# Patient Record
Sex: FEMALE | Race: ASIAN | NOT HISPANIC OR LATINO | URBAN - METROPOLITAN AREA
[De-identification: names, ages, dates, MRNs, and addresses within clinical notes are randomized per-mention and may not be internally consistent; named-entity substitution may affect disease eponyms.]

---

## 2020-06-30 ENCOUNTER — CLAIMS CREATED FROM THE CLAIM WINDOW (OUTPATIENT)
Dept: URBAN - METROPOLITAN AREA TELEHEALTH 2 | Facility: TELEHEALTH | Age: 55
End: 2020-06-30
Payer: COMMERCIAL

## 2020-06-30 DIAGNOSIS — F41.9 ANXIETY: ICD-10-CM

## 2020-06-30 DIAGNOSIS — K31.84 GASTROPARESIS: ICD-10-CM

## 2020-06-30 DIAGNOSIS — R07.89 ATYPICAL CHEST PAIN: ICD-10-CM

## 2020-06-30 DIAGNOSIS — R49.0 HOARSENESS: ICD-10-CM

## 2020-06-30 DIAGNOSIS — R09.89 GLOBUS PHARYNGEUS: ICD-10-CM

## 2020-06-30 DIAGNOSIS — K58.9 IBS (IRRITABLE BOWEL SYNDROME): ICD-10-CM

## 2020-06-30 DIAGNOSIS — K21.9 GERD: ICD-10-CM

## 2020-06-30 DIAGNOSIS — K59.01 CONSTIPATION: ICD-10-CM

## 2020-06-30 PROCEDURE — 99214 OFFICE O/P EST MOD 30 MIN: CPT | Performed by: INTERNAL MEDICINE

## 2020-06-30 RX ORDER — ESOMEPRAZOLE MAGNESIUM 40 MG/1
1 CAPSULE CAPSULE, DELAYED RELEASE ORAL ONCE A DAY
Status: ACTIVE | COMMUNITY

## 2020-06-30 RX ORDER — SUCRALFATE 1 G/1
TABLET ORAL
Qty: 0 | Refills: 0 | Status: DISCONTINUED | COMMUNITY
Start: 1900-01-01

## 2020-06-30 RX ORDER — OLOPATADINE HYDROCHLORIDE 665 UG/1
2 SPRAYS IN EACH NOSTRIL SPRAY NASAL TWICE A DAY
Status: ACTIVE | COMMUNITY

## 2020-06-30 RX ORDER — ESTRADIOL 0.03 MG/D
1 PATCH TO SKIN PATCH TRANSDERMAL
Status: DISCONTINUED | COMMUNITY

## 2020-06-30 RX ORDER — FAMOTIDINE 40 MG/1
TAKE 1 TABLET (40 MG) BY ORAL ROUTE ONCE DAILY AT BEDTIME FOR 30 DAYS TABLET ORAL 1
Qty: 30 | Refills: 11 | Status: DISCONTINUED | COMMUNITY
Start: 2019-10-17 | End: 2020-10-11

## 2020-06-30 RX ORDER — ONDANSETRON 4 MG/1
1 DOSE PO/SL Q4HR PRN NAUSEA TABLET, ORALLY DISINTEGRATING ORAL
Qty: 60 | Refills: 3 | Status: ACTIVE | COMMUNITY
Start: 2019-07-12

## 2020-06-30 RX ORDER — ESTRADIOL 14 UG/D
APPLY 1 PATCH TO THE LOWER ABDOMEN AREA BY TRANSDERMAL ROUTE EVERY 7 DAYS PATCH TRANSDERMAL
Qty: 1 | Refills: 0 | Status: DISCONTINUED | COMMUNITY
Start: 1900-01-01

## 2020-06-30 RX ORDER — DOCUSATE SODIUM 100 MG/1
1 CAPSULE AS NEEDED CAPSULE, LIQUID FILLED ORAL ONCE A DAY
Status: ACTIVE | COMMUNITY

## 2020-06-30 RX ORDER — ESTRADIOL 0.03 MG/D
1 PATCH TO SKIN PATCH TRANSDERMAL
Status: ACTIVE | COMMUNITY

## 2020-06-30 RX ORDER — ESTRADIOL 10 UG/1
INSERT VAGINAL
Qty: 0 | Refills: 0 | Status: ACTIVE | COMMUNITY
Start: 1900-01-01

## 2020-06-30 NOTE — HPI-OTHER HISTORIES
wt stable over 8mo = 111#  On PPI BID, H2B, and GI cocktail prn  CP/globus/hoarseness persistent  tried Reglan - helped somewhat - got lightheaded  tried Lexapro/Zoloft - caused fatigue  Diarrhea/Constip under good control uses stool soft and prunes to stay regular

## 2020-07-15 ENCOUNTER — OFFICE VISIT (OUTPATIENT)
Dept: URBAN - METROPOLITAN AREA CLINIC 27 | Facility: CLINIC | Age: 55
End: 2020-07-15

## 2021-01-15 ENCOUNTER — OFFICE VISIT (OUTPATIENT)
Dept: URBAN - METROPOLITAN AREA CLINIC 27 | Facility: CLINIC | Age: 56
End: 2021-01-15

## 2021-05-07 ENCOUNTER — OFFICE VISIT (OUTPATIENT)
Dept: URBAN - METROPOLITAN AREA MEDICAL CENTER 8 | Facility: MEDICAL CENTER | Age: 56
End: 2021-05-07

## 2021-06-18 ENCOUNTER — OFFICE VISIT (OUTPATIENT)
Dept: URBAN - METROPOLITAN AREA MEDICAL CENTER 8 | Facility: MEDICAL CENTER | Age: 56
End: 2021-06-18

## 2021-07-12 ENCOUNTER — OFFICE VISIT (OUTPATIENT)
Dept: URBAN - METROPOLITAN AREA CLINIC 27 | Facility: CLINIC | Age: 56
End: 2021-07-12

## 2021-09-10 ENCOUNTER — OFFICE VISIT (OUTPATIENT)
Dept: URBAN - METROPOLITAN AREA CLINIC 27 | Facility: CLINIC | Age: 56
End: 2021-09-10

## 2021-12-10 ENCOUNTER — OFFICE VISIT (OUTPATIENT)
Dept: URBAN - METROPOLITAN AREA CLINIC 27 | Facility: CLINIC | Age: 56
End: 2021-12-10

## 2022-04-30 ENCOUNTER — TELEPHONE ENCOUNTER (OUTPATIENT)
Dept: URBAN - METROPOLITAN AREA CLINIC 121 | Facility: CLINIC | Age: 57
End: 2022-04-30

## 2022-04-30 RX ORDER — METOCLOPRAMIDE HYDROCHLORIDE 5 MG/1
TABLET ORAL
OUTPATIENT
Start: 2020-03-16

## 2022-04-30 RX ORDER — BUPROPION HYDROCHLORIDE 300 MG/1
TABLET, EXTENDED RELEASE ORAL
OUTPATIENT
Start: 2020-03-16

## 2022-04-30 RX ORDER — FLUTICASONE PROPIONATE 50 UG/1
SPRAY, METERED NASAL
OUTPATIENT
Start: 2020-03-16

## 2022-04-30 RX ORDER — ESTRADIOL 10 UG/1
INSERT VAGINAL
OUTPATIENT
Start: 2020-03-16

## 2022-04-30 RX ORDER — ESOMEPRAZOLE MAGNESIUM 20 MG
TAKE 1 CAPSULE PO QD CAPSULE,DELAYED RELEASE (ENTERIC COATED) ORAL
OUTPATIENT
Start: 2021-08-09

## 2022-04-30 RX ORDER — LACTASE 9000 UNIT
TABLET ORAL
OUTPATIENT
Start: 2021-07-12

## 2022-04-30 RX ORDER — BACLOFEN 10 MG/1
TAKE 1 TABLET PO BID TABLET ORAL
OUTPATIENT
Start: 2020-03-16

## 2022-04-30 RX ORDER — ESOMEPRAZOLE MAGNESIUM 20 MG
CAPSULE,DELAYED RELEASE (ENTERIC COATED) ORAL
OUTPATIENT
Start: 2020-03-16

## 2022-04-30 RX ORDER — OLOPATADINE HYDROCHLORIDE 600 UG/1
SPRAY, METERED NASAL
OUTPATIENT
Start: 2020-03-16

## 2022-04-30 RX ORDER — CANDESARTAN CILEXETIL 16 MG/1
TABLET ORAL
OUTPATIENT
Start: 2020-03-16

## 2022-04-30 RX ORDER — CLINDAMYCIN PHOSPHATE 150 MG/ML
INJECTION, SOLUTION INTRAMUSCULAR; INTRAVENOUS
OUTPATIENT
Start: 2020-03-16

## 2022-04-30 RX ORDER — FAMOTIDINE 10 MG
TABLET ORAL
OUTPATIENT
Start: 2020-03-16

## 2022-04-30 RX ORDER — FAMOTIDINE 20 MG
TAKE 1 TAB PO QHS TABLET ORAL
OUTPATIENT
Start: 2021-07-12

## 2022-04-30 RX ORDER — OMEPRAZOLE 20 MG/1
1 CAPSULE PO QAM CAPSULE, DELAYED RELEASE ORAL
OUTPATIENT
Start: 2021-07-12 | End: 2021-08-11

## 2022-04-30 RX ORDER — PROGESTERONE 100 MG/1
CAPSULE ORAL
OUTPATIENT
Start: 2020-03-16

## 2022-04-30 RX ORDER — ELECTROLYTES/DEXTROSE
SOLUTION, ORAL ORAL
OUTPATIENT
Start: 2020-03-16

## 2022-04-30 RX ORDER — ESOMEPRAZOLE MAGNESIUM 40 MG/1
CAPSULE, DELAYED RELEASE ORAL
OUTPATIENT
Start: 2020-03-16

## 2022-04-30 RX ORDER — FAMOTIDINE 20 MG/1
TABLET ORAL
OUTPATIENT
Start: 2021-07-12

## 2022-04-30 RX ORDER — ESTRADIOL 0.03 MG/D
FILM, EXTENDED RELEASE TRANSDERMAL
OUTPATIENT
Start: 2020-03-16

## 2022-04-30 RX ORDER — ESOMEPRAZOLE MAGNESIUM 40 MG
1 CAPSULE PO QD CAPSULE,DELAYED RELEASE (ENTERIC COATED) ORAL
OUTPATIENT
Start: 2020-12-15

## 2022-05-01 ENCOUNTER — TELEPHONE ENCOUNTER (OUTPATIENT)
Dept: URBAN - METROPOLITAN AREA CLINIC 121 | Facility: CLINIC | Age: 57
End: 2022-05-01

## 2022-05-01 RX ORDER — ESOMEPRAZOLE MAGNESIUM 40 MG/1
CAPSULE, DELAYED RELEASE ORAL
Status: ACTIVE | COMMUNITY
Start: 2020-03-16

## 2022-05-01 RX ORDER — FAMOTIDINE 10 MG
TABLET ORAL
Status: ACTIVE | COMMUNITY
Start: 2020-03-16

## 2022-05-01 RX ORDER — CANDESARTAN CILEXETIL 16 MG/1
TABLET ORAL
Status: ACTIVE | COMMUNITY
Start: 2020-03-16

## 2022-05-01 RX ORDER — ESOMEPRAZOLE MAGNESIUM 20 MG
CAPSULE,DELAYED RELEASE (ENTERIC COATED) ORAL
Status: ACTIVE | COMMUNITY
Start: 2020-03-16

## 2022-05-01 RX ORDER — ESTRADIOL 0.03 MG/D
FILM, EXTENDED RELEASE TRANSDERMAL
Status: ACTIVE | COMMUNITY
Start: 2020-03-16

## 2022-05-01 RX ORDER — CHROMIUM 200 MCG
TABLET ORAL
Status: ACTIVE | COMMUNITY
Start: 2021-07-12

## 2022-05-01 RX ORDER — FAMOTIDINE 20 MG/1
TAKE 1 TABLET BY MOUTH EVERY NIGHT TABLET ORAL
Status: ACTIVE | COMMUNITY
Start: 2021-07-12 | End: 2023-04-03

## 2022-05-01 RX ORDER — CLINDAMYCIN PHOSPHATE 150 MG/ML
INJECTION, SOLUTION INTRAMUSCULAR; INTRAVENOUS
Status: ACTIVE | COMMUNITY
Start: 2020-03-16

## 2022-05-01 RX ORDER — ESOMEPRAZOLE MAGNESIUM 40 MG
1 CAPSULE BY MOUTH ONCE A DAY CAPSULE,DELAYED RELEASE (ENTERIC COATED) ORAL
Status: ACTIVE | COMMUNITY
Start: 2020-12-15

## 2022-05-01 RX ORDER — AMITRIPTYLINE HYDROCHLORIDE 10 MG/1
5MG PO QHS X1WEEK 10MG PO QHS X1WEEK 15MGPO QHS X1WEEK 20MGPO QHS X1WEEK TABLET, FILM COATED ORAL
Status: ACTIVE | COMMUNITY
Start: 2021-12-10

## 2022-05-01 RX ORDER — FAMOTIDINE 20 MG/1
TABLET ORAL
Status: ACTIVE | COMMUNITY
Start: 2021-07-12

## 2022-05-01 RX ORDER — ESTRADIOL 10 UG/1
INSERT VAGINAL
Status: ACTIVE | COMMUNITY
Start: 2020-03-16

## 2022-05-01 RX ORDER — ELECTROLYTES/DEXTROSE
SOLUTION, ORAL ORAL
Status: ACTIVE | COMMUNITY
Start: 2020-03-16

## 2022-05-01 RX ORDER — OLOPATADINE HYDROCHLORIDE 600 UG/1
SPRAY, METERED NASAL
Status: ACTIVE | COMMUNITY
Start: 2020-03-16

## 2022-05-01 RX ORDER — BACLOFEN 10 MG/1
TAKE 1 TABLET BY MOUTH TWICE A DAY TABLET ORAL
Status: ACTIVE | COMMUNITY
Start: 2020-03-16

## 2022-05-01 RX ORDER — BETAMETHASONE DIPROPIONATE 0.5 MG/G
CREAM TOPICAL
Status: ACTIVE | COMMUNITY
Start: 2020-03-16

## 2022-05-01 RX ORDER — METOCLOPRAMIDE HYDROCHLORIDE 5 MG/1
TABLET ORAL
Status: ACTIVE | COMMUNITY
Start: 2020-03-16

## 2022-05-01 RX ORDER — PROGESTERONE 100 MG/1
CAPSULE ORAL
Status: ACTIVE | COMMUNITY
Start: 2020-03-16

## 2022-05-01 RX ORDER — FLUTICASONE PROPIONATE 50 UG/1
SPRAY, METERED NASAL
Status: ACTIVE | COMMUNITY
Start: 2020-03-16

## 2022-05-01 RX ORDER — ESOMEPRAZOLE MAGNESIUM 20 MG
TAKE 1 CAPSULE BY MOUTH ONCE A DAY CAPSULE,DELAYED RELEASE (ENTERIC COATED) ORAL
Status: ACTIVE | COMMUNITY
Start: 2021-08-09

## 2022-05-01 RX ORDER — BUPROPION HYDROCHLORIDE 300 MG/1
TABLET, EXTENDED RELEASE ORAL
Status: ACTIVE | COMMUNITY
Start: 2020-03-16

## 2022-05-10 ENCOUNTER — OFFICE VISIT (OUTPATIENT)
Dept: URBAN - METROPOLITAN AREA CLINIC 27 | Facility: CLINIC | Age: 57
End: 2022-05-10

## 2022-05-16 ENCOUNTER — WEB ENCOUNTER (OUTPATIENT)
Dept: URBAN - METROPOLITAN AREA CLINIC 27 | Facility: CLINIC | Age: 57
End: 2022-05-16

## 2022-05-16 RX ORDER — ONDANSETRON 4 MG/1
1 DOSE PO/SL Q4HR PRN NAUSEA TABLET, ORALLY DISINTEGRATING ORAL
Qty: 60 | Refills: 3 | Status: ACTIVE | COMMUNITY
Start: 2019-07-12

## 2022-05-16 RX ORDER — OLOPATADINE HYDROCHLORIDE 600 UG/1
SPRAY, METERED NASAL
Status: ACTIVE | COMMUNITY
Start: 2020-03-16

## 2022-05-16 RX ORDER — BACLOFEN 10 MG/1
TAKE 1 TABLET BY MOUTH TWICE A DAY TABLET ORAL
Status: ACTIVE | COMMUNITY
Start: 2020-03-16

## 2022-05-16 RX ORDER — ESOMEPRAZOLE MAGNESIUM 20 MG
TAKE 1 CAPSULE BY MOUTH ONCE A DAY CAPSULE,DELAYED RELEASE (ENTERIC COATED) ORAL
Status: ACTIVE | COMMUNITY
Start: 2021-08-09

## 2022-05-16 RX ORDER — CLINDAMYCIN PHOSPHATE 150 MG/ML
INJECTION, SOLUTION INTRAMUSCULAR; INTRAVENOUS
Status: ACTIVE | COMMUNITY
Start: 2020-03-16

## 2022-05-16 RX ORDER — BUPROPION HYDROCHLORIDE 300 MG/1
TABLET, EXTENDED RELEASE ORAL
Status: ACTIVE | COMMUNITY
Start: 2020-03-16

## 2022-05-16 RX ORDER — OLOPATADINE HYDROCHLORIDE 665 UG/1
2 SPRAYS IN EACH NOSTRIL SPRAY NASAL TWICE A DAY
Status: ACTIVE | COMMUNITY

## 2022-05-16 RX ORDER — FLUTICASONE PROPIONATE 50 UG/1
SPRAY, METERED NASAL
Status: ACTIVE | COMMUNITY
Start: 2020-03-16

## 2022-05-16 RX ORDER — FAMOTIDINE 10 MG
TABLET ORAL
Status: ACTIVE | COMMUNITY
Start: 2020-03-16

## 2022-05-16 RX ORDER — ELECTROLYTES/DEXTROSE
SOLUTION, ORAL ORAL
Status: ACTIVE | COMMUNITY
Start: 2020-03-16

## 2022-05-16 RX ORDER — ESTRADIOL 10 UG/1
INSERT VAGINAL
Status: ACTIVE | COMMUNITY
Start: 2020-03-16

## 2022-05-16 RX ORDER — ESOMEPRAZOLE MAGNESIUM 40 MG/1
1 CAPSULE CAPSULE, DELAYED RELEASE ORAL ONCE A DAY
Status: ACTIVE | COMMUNITY

## 2022-05-16 RX ORDER — AMITRIPTYLINE HYDROCHLORIDE 10 MG/1
5MG PO QHS X1WEEK 10MG PO QHS X1WEEK 15MGPO QHS X1WEEK 20MGPO QHS X1WEEK TABLET, FILM COATED ORAL
Status: ACTIVE | COMMUNITY
Start: 2021-12-10

## 2022-05-16 RX ORDER — ESTRADIOL 0.03 MG/D
FILM, EXTENDED RELEASE TRANSDERMAL
Status: ACTIVE | COMMUNITY
Start: 2020-03-16

## 2022-05-16 RX ORDER — BETAMETHASONE DIPROPIONATE 0.5 MG/G
CREAM TOPICAL
Status: ACTIVE | COMMUNITY
Start: 2020-03-16

## 2022-05-16 RX ORDER — CANDESARTAN CILEXETIL 16 MG/1
TABLET ORAL
Status: ACTIVE | COMMUNITY
Start: 2020-03-16

## 2022-05-16 RX ORDER — PROGESTERONE 100 MG/1
CAPSULE ORAL
Status: ACTIVE | COMMUNITY
Start: 2020-03-16

## 2022-05-16 RX ORDER — METOCLOPRAMIDE HYDROCHLORIDE 5 MG/1
TABLET ORAL
Status: ACTIVE | COMMUNITY
Start: 2020-03-16

## 2022-05-16 RX ORDER — ESOMEPRAZOLE MAGNESIUM 40 MG
1 CAPSULE BY MOUTH ONCE A DAY CAPSULE,DELAYED RELEASE (ENTERIC COATED) ORAL
Status: ACTIVE | COMMUNITY
Start: 2020-12-15

## 2022-05-16 RX ORDER — DOCUSATE SODIUM 100 MG/1
1 CAPSULE AS NEEDED CAPSULE, LIQUID FILLED ORAL ONCE A DAY
Status: ACTIVE | COMMUNITY

## 2022-05-16 RX ORDER — ESOMEPRAZOLE MAGNESIUM 40 MG/1
CAPSULE, DELAYED RELEASE ORAL
Status: ACTIVE | COMMUNITY
Start: 2020-03-16

## 2022-05-16 RX ORDER — ESTRADIOL 0.03 MG/D
1 PATCH TO SKIN PATCH TRANSDERMAL
Status: ACTIVE | COMMUNITY

## 2022-05-16 RX ORDER — FAMOTIDINE 20 MG/1
TAKE 1 TABLET BY MOUTH EVERY NIGHT TABLET ORAL
Status: ACTIVE | COMMUNITY
Start: 2021-07-12 | End: 2023-04-03

## 2022-05-16 RX ORDER — FAMOTIDINE 40 MG/1
1 TABLET TABLET, FILM COATED ORAL ONCE A DAY
Qty: 30 TABLET | Refills: 3 | OUTPATIENT
Start: 2022-05-16

## 2022-05-16 RX ORDER — CHROMIUM 200 MCG
TABLET ORAL
Status: ACTIVE | COMMUNITY
Start: 2021-07-12

## 2022-06-17 ENCOUNTER — WEB ENCOUNTER (OUTPATIENT)
Dept: URBAN - METROPOLITAN AREA CLINIC 27 | Facility: CLINIC | Age: 57
End: 2022-06-17

## 2022-06-17 ENCOUNTER — CLAIMS CREATED FROM THE CLAIM WINDOW (OUTPATIENT)
Dept: URBAN - METROPOLITAN AREA CLINIC 27 | Facility: CLINIC | Age: 57
End: 2022-06-17
Payer: COMMERCIAL

## 2022-06-17 VITALS
HEART RATE: 82 BPM | RESPIRATION RATE: 17 BRPM | HEIGHT: 63 IN | TEMPERATURE: 96.9 F | BODY MASS INDEX: 19.84 KG/M2 | WEIGHT: 112 LBS | DIASTOLIC BLOOD PRESSURE: 88 MMHG | SYSTOLIC BLOOD PRESSURE: 119 MMHG

## 2022-06-17 DIAGNOSIS — K58.9 IBS (IRRITABLE BOWEL SYNDROME): ICD-10-CM

## 2022-06-17 DIAGNOSIS — K31.84 GASTROPARESIS: ICD-10-CM

## 2022-06-17 DIAGNOSIS — K21.9 GERD: ICD-10-CM

## 2022-06-17 DIAGNOSIS — R49.8 OTHER VOICE AND RESONANCE DISORDERS: ICD-10-CM

## 2022-06-17 PROCEDURE — 99214 OFFICE O/P EST MOD 30 MIN: CPT | Performed by: INTERNAL MEDICINE

## 2022-06-17 RX ORDER — BETAMETHASONE DIPROPIONATE 0.5 MG/G
CREAM TOPICAL
Status: ACTIVE | COMMUNITY
Start: 2020-03-16

## 2022-06-17 RX ORDER — ESOMEPRAZOLE MAGNESIUM 20 MG
TAKE 1 CAPSULE BY MOUTH ONCE A DAY CAPSULE,DELAYED RELEASE (ENTERIC COATED) ORAL
Status: ACTIVE | COMMUNITY
Start: 2021-08-09

## 2022-06-17 RX ORDER — ONDANSETRON 4 MG/1
1 DOSE PO/SL Q4HR PRN NAUSEA TABLET, ORALLY DISINTEGRATING ORAL
Qty: 60 | Refills: 3 | Status: ACTIVE | COMMUNITY
Start: 2019-07-12

## 2022-06-17 RX ORDER — ESTRADIOL 0.03 MG/D
FILM, EXTENDED RELEASE TRANSDERMAL
Status: ACTIVE | COMMUNITY
Start: 2020-03-16

## 2022-06-17 RX ORDER — CANDESARTAN CILEXETIL 16 MG/1
TABLET ORAL
Status: ACTIVE | COMMUNITY
Start: 2020-03-16

## 2022-06-17 RX ORDER — DOCUSATE SODIUM 100 MG/1
1 CAPSULE AS NEEDED CAPSULE, LIQUID FILLED ORAL ONCE A DAY
Status: ACTIVE | COMMUNITY

## 2022-06-17 RX ORDER — CLINDAMYCIN PHOSPHATE 150 MG/ML
INJECTION, SOLUTION INTRAMUSCULAR; INTRAVENOUS
Status: ACTIVE | COMMUNITY
Start: 2020-03-16

## 2022-06-17 RX ORDER — AMITRIPTYLINE HYDROCHLORIDE 10 MG/1
5MG PO QHS X1WEEK 10MG PO QHS X1WEEK 15MGPO QHS X1WEEK 20MGPO QHS X1WEEK TABLET, FILM COATED ORAL
Status: ACTIVE | COMMUNITY
Start: 2021-12-10

## 2022-06-17 RX ORDER — ESOMEPRAZOLE MAGNESIUM 20 MG/1
1 CAPSULE CAPSULE, DELAYED RELEASE ORAL ONCE A DAY
Qty: 30 | OUTPATIENT
Start: 2022-06-17

## 2022-06-17 RX ORDER — BACLOFEN 10 MG/1
TAKE 1 TABLET BY MOUTH TWICE A DAY TABLET ORAL
Status: ACTIVE | COMMUNITY
Start: 2020-03-16

## 2022-06-17 RX ORDER — ESOMEPRAZOLE MAGNESIUM 40 MG
1 CAPSULE BY MOUTH ONCE A DAY CAPSULE,DELAYED RELEASE (ENTERIC COATED) ORAL
Status: ACTIVE | COMMUNITY
Start: 2020-12-15

## 2022-06-17 RX ORDER — FAMOTIDINE 20 MG/1
TAKE 1 TABLET BY MOUTH EVERY NIGHT TABLET ORAL
Status: ACTIVE | COMMUNITY
Start: 2021-07-12 | End: 2023-04-03

## 2022-06-17 RX ORDER — ESTRADIOL 0.03 MG/D
1 PATCH TO SKIN PATCH TRANSDERMAL
Status: ACTIVE | COMMUNITY

## 2022-06-17 RX ORDER — ESOMEPRAZOLE MAGNESIUM 40 MG/1
CAPSULE, DELAYED RELEASE ORAL
Status: ACTIVE | COMMUNITY
Start: 2020-03-16

## 2022-06-17 RX ORDER — ESOMEPRAZOLE MAGNESIUM 40 MG/1
1 CAPSULE CAPSULE, DELAYED RELEASE ORAL ONCE A DAY
Status: ACTIVE | COMMUNITY

## 2022-06-17 RX ORDER — CHROMIUM 200 MCG
TABLET ORAL
Status: ACTIVE | COMMUNITY
Start: 2021-07-12

## 2022-06-17 RX ORDER — PROGESTERONE 100 MG/1
CAPSULE ORAL
Status: ACTIVE | COMMUNITY
Start: 2020-03-16

## 2022-06-17 RX ORDER — ESTRADIOL 10 UG/1
INSERT VAGINAL
Status: ACTIVE | COMMUNITY
Start: 2020-03-16

## 2022-06-17 RX ORDER — FLUTICASONE PROPIONATE 50 UG/1
SPRAY, METERED NASAL
Status: ACTIVE | COMMUNITY
Start: 2020-03-16

## 2022-06-17 RX ORDER — OLOPATADINE HYDROCHLORIDE 600 UG/1
SPRAY, METERED NASAL
Status: ACTIVE | COMMUNITY
Start: 2020-03-16

## 2022-06-17 RX ORDER — METOCLOPRAMIDE HYDROCHLORIDE 5 MG/1
TABLET ORAL
Status: ACTIVE | COMMUNITY
Start: 2020-03-16

## 2022-06-17 RX ORDER — FAMOTIDINE 40 MG/1
1 TABLET TABLET, FILM COATED ORAL ONCE A DAY
Qty: 30 TABLET | Refills: 3 | Status: ACTIVE | COMMUNITY
Start: 2022-05-16

## 2022-06-17 RX ORDER — BUPROPION HYDROCHLORIDE 300 MG/1
TABLET, EXTENDED RELEASE ORAL
Status: ACTIVE | COMMUNITY
Start: 2020-03-16

## 2022-06-17 RX ORDER — OLOPATADINE HYDROCHLORIDE 665 UG/1
2 SPRAYS IN EACH NOSTRIL SPRAY NASAL TWICE A DAY
Status: ACTIVE | COMMUNITY

## 2022-06-17 RX ORDER — FAMOTIDINE 10 MG
TABLET ORAL
Status: ACTIVE | COMMUNITY
Start: 2020-03-16

## 2022-06-17 RX ORDER — ELECTROLYTES/DEXTROSE
SOLUTION, ORAL ORAL
Status: ACTIVE | COMMUNITY
Start: 2020-03-16

## 2022-06-17 RX ORDER — FAMOTIDINE 40 MG/1
1 TABLET AT BEDTIME TABLET, FILM COATED ORAL ONCE A DAY
Qty: 30 | OUTPATIENT
Start: 2022-06-17

## 2022-06-17 NOTE — HPI-TODAY'S VISIT:
This is a 57-year-old female seen in consultation today for her reflux issues.  Previous manometry suggested EG J outflow obstruction.  pH study was positive.  Barium swallow question whether there was an evolving achalasia.  She finds that she is now sensitive to soy products.  She has dairy intolerance.  Generally she is swallowing pretty well but sometimes dry foods will get stuck.  Her reflux is not any worse she also had a suggestion of gastroparesis.  She had side effects to Reglan.  But only mild reflux symptoms currently.

## 2022-07-25 ENCOUNTER — WEB ENCOUNTER (OUTPATIENT)
Dept: URBAN - METROPOLITAN AREA CLINIC 27 | Facility: CLINIC | Age: 57
End: 2022-07-25

## 2022-07-27 ENCOUNTER — WEB ENCOUNTER (OUTPATIENT)
Dept: URBAN - METROPOLITAN AREA CLINIC 27 | Facility: CLINIC | Age: 57
End: 2022-07-27

## 2022-07-29 ENCOUNTER — OFFICE VISIT (OUTPATIENT)
Dept: URBAN - METROPOLITAN AREA CLINIC 27 | Facility: CLINIC | Age: 57
End: 2022-07-29
Payer: COMMERCIAL

## 2022-07-29 ENCOUNTER — WEB ENCOUNTER (OUTPATIENT)
Dept: URBAN - METROPOLITAN AREA CLINIC 27 | Facility: CLINIC | Age: 57
End: 2022-07-29

## 2022-07-29 DIAGNOSIS — K21.9 GERD: ICD-10-CM

## 2022-07-29 DIAGNOSIS — K58.9 IBS (IRRITABLE BOWEL SYNDROME): ICD-10-CM

## 2022-07-29 DIAGNOSIS — K31.84 GASTROPARESIS: ICD-10-CM

## 2022-07-29 DIAGNOSIS — R49.8 OTHER VOICE AND RESONANCE DISORDERS: ICD-10-CM

## 2022-07-29 PROCEDURE — 99213 OFFICE O/P EST LOW 20 MIN: CPT | Performed by: INTERNAL MEDICINE

## 2022-07-29 RX ORDER — FAMOTIDINE 10 MG
TABLET ORAL
Status: ACTIVE | COMMUNITY
Start: 2020-03-16

## 2022-07-29 RX ORDER — ESOMEPRAZOLE MAGNESIUM 20 MG
TAKE 1 CAPSULE BY MOUTH ONCE A DAY CAPSULE,DELAYED RELEASE (ENTERIC COATED) ORAL
Status: ACTIVE | COMMUNITY
Start: 2021-08-09

## 2022-07-29 RX ORDER — ELECTROLYTES/DEXTROSE
SOLUTION, ORAL ORAL
Status: ACTIVE | COMMUNITY
Start: 2020-03-16

## 2022-07-29 RX ORDER — ESOMEPRAZOLE MAGNESIUM 20 MG/1
1 CAPSULE CAPSULE, DELAYED RELEASE ORAL ONCE A DAY
Qty: 30 | Status: ACTIVE | COMMUNITY
Start: 2022-06-17

## 2022-07-29 RX ORDER — OLOPATADINE HYDROCHLORIDE 600 UG/1
SPRAY, METERED NASAL
Status: ACTIVE | COMMUNITY
Start: 2020-03-16

## 2022-07-29 RX ORDER — DOCUSATE SODIUM 100 MG/1
1 CAPSULE AS NEEDED CAPSULE, LIQUID FILLED ORAL ONCE A DAY
Status: ACTIVE | COMMUNITY

## 2022-07-29 RX ORDER — ONDANSETRON 4 MG/1
1 DOSE PO/SL Q4HR PRN NAUSEA TABLET, ORALLY DISINTEGRATING ORAL
Qty: 60 | Refills: 3 | Status: ACTIVE | COMMUNITY
Start: 2019-07-12

## 2022-07-29 RX ORDER — CHROMIUM 200 MCG
TABLET ORAL
Status: ACTIVE | COMMUNITY
Start: 2021-07-12

## 2022-07-29 RX ORDER — OLOPATADINE HYDROCHLORIDE 665 UG/1
2 SPRAYS IN EACH NOSTRIL SPRAY NASAL TWICE A DAY
Status: ACTIVE | COMMUNITY

## 2022-07-29 RX ORDER — AMITRIPTYLINE HYDROCHLORIDE 10 MG/1
5MG PO QHS X1WEEK 10MG PO QHS X1WEEK 15MGPO QHS X1WEEK 20MGPO QHS X1WEEK TABLET, FILM COATED ORAL
Status: ACTIVE | COMMUNITY
Start: 2021-12-10

## 2022-07-29 RX ORDER — PROGESTERONE 100 MG/1
CAPSULE ORAL
Status: ACTIVE | COMMUNITY
Start: 2020-03-16

## 2022-07-29 RX ORDER — FLUTICASONE PROPIONATE 50 UG/1
SPRAY, METERED NASAL
Status: ACTIVE | COMMUNITY
Start: 2020-03-16

## 2022-07-29 RX ORDER — BACLOFEN 10 MG/1
TAKE 1 TABLET BY MOUTH TWICE A DAY TABLET ORAL
Status: ACTIVE | COMMUNITY
Start: 2020-03-16

## 2022-07-29 RX ORDER — ESTRADIOL 0.03 MG/D
1 PATCH TO SKIN PATCH TRANSDERMAL
Status: ACTIVE | COMMUNITY

## 2022-07-29 RX ORDER — CANDESARTAN CILEXETIL 16 MG/1
TABLET ORAL
Status: ACTIVE | COMMUNITY
Start: 2020-03-16

## 2022-07-29 RX ORDER — ESTRADIOL 10 UG/1
INSERT VAGINAL
Status: ACTIVE | COMMUNITY
Start: 2020-03-16

## 2022-07-29 RX ORDER — BETAMETHASONE DIPROPIONATE 0.5 MG/G
CREAM TOPICAL
Status: ACTIVE | COMMUNITY
Start: 2020-03-16

## 2022-07-29 RX ORDER — METOCLOPRAMIDE HYDROCHLORIDE 5 MG/1
TABLET ORAL
Status: ACTIVE | COMMUNITY
Start: 2020-03-16

## 2022-07-29 RX ORDER — CLINDAMYCIN PHOSPHATE 150 MG/ML
INJECTION, SOLUTION INTRAMUSCULAR; INTRAVENOUS
Status: ACTIVE | COMMUNITY
Start: 2020-03-16

## 2022-07-29 RX ORDER — ESOMEPRAZOLE MAGNESIUM 40 MG/1
1 CAPSULE CAPSULE, DELAYED RELEASE ORAL ONCE A DAY
Status: ACTIVE | COMMUNITY

## 2022-07-29 RX ORDER — FAMOTIDINE 20 MG/1
TAKE 1 TABLET BY MOUTH EVERY NIGHT TABLET ORAL
Status: ACTIVE | COMMUNITY
Start: 2021-07-12 | End: 2023-04-03

## 2022-07-29 RX ORDER — ESTRADIOL 0.03 MG/D
FILM, EXTENDED RELEASE TRANSDERMAL
Status: ACTIVE | COMMUNITY
Start: 2020-03-16

## 2022-07-29 RX ORDER — FAMOTIDINE 40 MG/1
1 TABLET TABLET, FILM COATED ORAL ONCE A DAY
Qty: 30 TABLET | Refills: 3 | Status: ACTIVE | COMMUNITY
Start: 2022-05-16

## 2022-07-29 RX ORDER — ESOMEPRAZOLE MAGNESIUM 40 MG/1
CAPSULE, DELAYED RELEASE ORAL
Status: ACTIVE | COMMUNITY
Start: 2020-03-16

## 2022-07-29 RX ORDER — BUPROPION HYDROCHLORIDE 300 MG/1
TABLET, EXTENDED RELEASE ORAL
Status: ACTIVE | COMMUNITY
Start: 2020-03-16

## 2022-07-29 RX ORDER — FAMOTIDINE 40 MG/1
1 TABLET AT BEDTIME TABLET, FILM COATED ORAL ONCE A DAY
Qty: 30 | Status: ACTIVE | COMMUNITY
Start: 2022-06-17

## 2022-07-29 RX ORDER — ESOMEPRAZOLE MAGNESIUM 40 MG
1 CAPSULE BY MOUTH ONCE A DAY CAPSULE,DELAYED RELEASE (ENTERIC COATED) ORAL
Status: ACTIVE | COMMUNITY
Start: 2020-12-15

## 2022-07-29 NOTE — HPI-TODAY'S VISIT:
This is a 57-year-old female seen in follow-up consultation via telehealth for her upper GI issues.  She had a manometry previously that suggested an evolving achalasia.  She continues to complain of dysphonia although no overt reflux but now feels that she is having sinus issues and fullness in the sinuses.  She also complains of hoarseness.  She saw an ENT who felt that reflux may be contributing but that the sinuses were clear.  She feels sinus pressure.  She never tried the Elavil I gave her but was recently referred to a psychiatrist and was put on citalopram but she has had trouble tolerating it for the past few days and is stopped it.  Recent timed barium swallow showed delay in emptying at the GE junction at 5 minutes

## 2022-10-13 ENCOUNTER — TELEPHONE ENCOUNTER (OUTPATIENT)
Dept: URBAN - METROPOLITAN AREA CLINIC 27 | Facility: CLINIC | Age: 57
End: 2022-10-13

## 2022-10-13 ENCOUNTER — OFFICE VISIT (OUTPATIENT)
Dept: URBAN - METROPOLITAN AREA CLINIC 27 | Facility: CLINIC | Age: 57
End: 2022-10-13
Payer: COMMERCIAL

## 2022-10-13 ENCOUNTER — WEB ENCOUNTER (OUTPATIENT)
Dept: URBAN - METROPOLITAN AREA CLINIC 27 | Facility: CLINIC | Age: 57
End: 2022-10-13

## 2022-10-13 VITALS
HEART RATE: 81 BPM | TEMPERATURE: 96.9 F | WEIGHT: 115 LBS | HEIGHT: 63 IN | BODY MASS INDEX: 20.38 KG/M2 | SYSTOLIC BLOOD PRESSURE: 128 MMHG | DIASTOLIC BLOOD PRESSURE: 87 MMHG

## 2022-10-13 DIAGNOSIS — K58.9 IBS (IRRITABLE BOWEL SYNDROME): ICD-10-CM

## 2022-10-13 DIAGNOSIS — K21.9 GERD: ICD-10-CM

## 2022-10-13 DIAGNOSIS — K31.84 GASTROPARESIS: ICD-10-CM

## 2022-10-13 DIAGNOSIS — R49.8 OTHER VOICE AND RESONANCE DISORDERS: ICD-10-CM

## 2022-10-13 PROBLEM — 235675006 GASTROPARESIS: Status: ACTIVE | Noted: 2020-06-29

## 2022-10-13 PROBLEM — 47004009 DISORDER OF VOICE: Status: ACTIVE | Noted: 2020-03-16

## 2022-10-13 PROCEDURE — 99214 OFFICE O/P EST MOD 30 MIN: CPT | Performed by: INTERNAL MEDICINE

## 2022-10-13 RX ORDER — ESOMEPRAZOLE MAGNESIUM 40 MG/1
1 CAPSULE CAPSULE, DELAYED RELEASE ORAL ONCE A DAY
Status: ACTIVE | COMMUNITY

## 2022-10-13 RX ORDER — AMITRIPTYLINE HYDROCHLORIDE 10 MG/1
5MG PO QHS X1WEEK 10MG PO QHS X1WEEK 15MGPO QHS X1WEEK 20MGPO QHS X1WEEK TABLET, FILM COATED ORAL
Status: ACTIVE | COMMUNITY
Start: 2021-12-10

## 2022-10-13 RX ORDER — FAMOTIDINE 10 MG
TABLET ORAL
Status: ACTIVE | COMMUNITY
Start: 2020-03-16

## 2022-10-13 RX ORDER — BUPROPION HYDROCHLORIDE 300 MG/1
TABLET, EXTENDED RELEASE ORAL
Status: ACTIVE | COMMUNITY
Start: 2020-03-16

## 2022-10-13 RX ORDER — OLOPATADINE HYDROCHLORIDE 665 UG/1
2 SPRAYS IN EACH NOSTRIL SPRAY NASAL TWICE A DAY
Status: ACTIVE | COMMUNITY

## 2022-10-13 RX ORDER — PANTOPRAZOLE SODIUM 40 MG/1
1 TABLET TABLET, DELAYED RELEASE ORAL TWICE A DAY
Qty: 60 | Refills: 2 | OUTPATIENT

## 2022-10-13 RX ORDER — CLINDAMYCIN PHOSPHATE 150 MG/ML
INJECTION, SOLUTION INTRAMUSCULAR; INTRAVENOUS
Status: ACTIVE | COMMUNITY
Start: 2020-03-16

## 2022-10-13 RX ORDER — METOCLOPRAMIDE HYDROCHLORIDE 5 MG/1
TABLET ORAL
Status: ACTIVE | COMMUNITY
Start: 2020-03-16

## 2022-10-13 RX ORDER — ESTRADIOL 0.03 MG/D
FILM, EXTENDED RELEASE TRANSDERMAL
Status: ACTIVE | COMMUNITY
Start: 2020-03-16

## 2022-10-13 RX ORDER — BACLOFEN 10 MG/1
TAKE 1 TABLET BY MOUTH TWICE A DAY TABLET ORAL
Status: ACTIVE | COMMUNITY
Start: 2020-03-16

## 2022-10-13 RX ORDER — FAMOTIDINE 20 MG/1
TAKE 1 TABLET BY MOUTH EVERY NIGHT TABLET ORAL
Status: ACTIVE | COMMUNITY
Start: 2021-07-12 | End: 2023-04-03

## 2022-10-13 RX ORDER — FLUTICASONE PROPIONATE 50 UG/1
SPRAY, METERED NASAL
Status: ACTIVE | COMMUNITY
Start: 2020-03-16

## 2022-10-13 RX ORDER — ESTRADIOL 10 UG/1
INSERT VAGINAL
Status: ACTIVE | COMMUNITY
Start: 2020-03-16

## 2022-10-13 RX ORDER — ONDANSETRON 4 MG/1
1 DOSE PO/SL Q4HR PRN NAUSEA TABLET, ORALLY DISINTEGRATING ORAL
Qty: 60 | Refills: 3 | Status: ACTIVE | COMMUNITY
Start: 2019-07-12

## 2022-10-13 RX ORDER — OLOPATADINE HYDROCHLORIDE 600 UG/1
SPRAY, METERED NASAL
Status: ACTIVE | COMMUNITY
Start: 2020-03-16

## 2022-10-13 RX ORDER — FAMOTIDINE 40 MG/1
1 TABLET AT BEDTIME TABLET, FILM COATED ORAL ONCE A DAY
Qty: 30 | Status: ACTIVE | COMMUNITY
Start: 2022-06-17

## 2022-10-13 RX ORDER — ESTRADIOL 0.03 MG/D
1 PATCH TO SKIN PATCH TRANSDERMAL
Status: ACTIVE | COMMUNITY

## 2022-10-13 RX ORDER — ESOMEPRAZOLE MAGNESIUM 20 MG
TAKE 1 CAPSULE BY MOUTH ONCE A DAY CAPSULE,DELAYED RELEASE (ENTERIC COATED) ORAL
Status: ACTIVE | COMMUNITY
Start: 2021-08-09

## 2022-10-13 RX ORDER — CANDESARTAN CILEXETIL 16 MG/1
TABLET ORAL
Status: ACTIVE | COMMUNITY
Start: 2020-03-16

## 2022-10-13 RX ORDER — ESOMEPRAZOLE MAGNESIUM 40 MG
1 CAPSULE BY MOUTH ONCE A DAY CAPSULE,DELAYED RELEASE (ENTERIC COATED) ORAL
Status: ACTIVE | COMMUNITY
Start: 2020-12-15

## 2022-10-13 RX ORDER — ESOMEPRAZOLE MAGNESIUM 40 MG/1
CAPSULE, DELAYED RELEASE ORAL
Status: ACTIVE | COMMUNITY
Start: 2020-03-16

## 2022-10-13 RX ORDER — BETAMETHASONE DIPROPIONATE 0.5 MG/G
CREAM TOPICAL
Status: ACTIVE | COMMUNITY
Start: 2020-03-16

## 2022-10-13 RX ORDER — CHROMIUM 200 MCG
TABLET ORAL
Status: ACTIVE | COMMUNITY
Start: 2021-07-12

## 2022-10-13 RX ORDER — ELECTROLYTES/DEXTROSE
SOLUTION, ORAL ORAL
Status: ACTIVE | COMMUNITY
Start: 2020-03-16

## 2022-10-13 RX ORDER — METHYLPREDNISOLONE 4 MG/1
5 PILLS FIRST DAY, THEN 4 PILLS SECOND DAY, THEN 3 PILLS FOR A DAY, THEN 2 PILL A DAY, THEN 1 PILL A DAY TABLET ORAL
Qty: 15 | Refills: 0 | OUTPATIENT

## 2022-10-13 RX ORDER — PROGESTERONE 100 MG/1
CAPSULE ORAL
Status: ACTIVE | COMMUNITY
Start: 2020-03-16

## 2022-10-13 RX ORDER — FAMOTIDINE 40 MG/1
1 TABLET TABLET, FILM COATED ORAL ONCE A DAY
Qty: 30 TABLET | Refills: 3 | Status: ACTIVE | COMMUNITY
Start: 2022-05-16

## 2022-10-13 RX ORDER — DOCUSATE SODIUM 100 MG/1
1 CAPSULE AS NEEDED CAPSULE, LIQUID FILLED ORAL ONCE A DAY
Status: ACTIVE | COMMUNITY

## 2022-10-13 RX ORDER — ESOMEPRAZOLE MAGNESIUM 20 MG/1
1 CAPSULE CAPSULE, DELAYED RELEASE ORAL ONCE A DAY
Qty: 30 | Status: ACTIVE | COMMUNITY
Start: 2022-06-17

## 2022-10-13 NOTE — HPI-TODAY'S VISIT:
This is a 57-year-old female seen in consultation today for a reaction to her COVID and flu vaccines.  She developed severe left shoulder pain.  It radiated across the top of her chest to her right side.  She took multiple Advil's.  She saw her PCP and urgent care.  She was given 2 steroid injections as well as a Medrol Dosepak.  Those seem to help the symptoms but as they were off she developed the chest discomfort again.  She had an EKG done which was normal.  She is definitely doing better but concerned about these symptoms.  She is wondering whether the Advil could be contributing.  She is no longer taking the Advil.  Also of note she has chronic reflux symptoms.  She does notice dysphagia over the course of the year.  She has had evaluation with manometry suggesting EG J outflow obstruction and a timed barium swallow with delay of emptying at 5 minutes.  It was suggested that this may be evolving achalasia.  She also is very stressed and sees a psychiatrist.  Her psychiatrist thinks some of her symptoms are related to the anxiety component.  She is not able to tolerate multiple SSRIs but is willing to try Cymbalta with him.  She also would like to try a different PPI therapy instead of the Nexium.  She said Dexilant was too expensive when suggested by her PCP.  She uses some Tums for bitterness at night.  She also has sinus issues as well.  She has a lot of concern over her chronic issues.

## 2022-10-19 ENCOUNTER — OFFICE VISIT (OUTPATIENT)
Dept: URBAN - METROPOLITAN AREA CLINIC 27 | Facility: CLINIC | Age: 57
End: 2022-10-19

## 2022-10-24 ENCOUNTER — WEB ENCOUNTER (OUTPATIENT)
Dept: URBAN - METROPOLITAN AREA CLINIC 27 | Facility: CLINIC | Age: 57
End: 2022-10-24

## 2023-04-06 ENCOUNTER — TELEPHONE ENCOUNTER (OUTPATIENT)
Dept: URBAN - METROPOLITAN AREA CLINIC 27 | Facility: CLINIC | Age: 58
End: 2023-04-06

## 2023-05-08 ENCOUNTER — OFFICE VISIT (OUTPATIENT)
Dept: URBAN - METROPOLITAN AREA TELEHEALTH 2 | Facility: TELEHEALTH | Age: 58
End: 2023-05-08
Payer: COMMERCIAL

## 2023-05-08 DIAGNOSIS — K21.9 GERD: ICD-10-CM

## 2023-05-08 DIAGNOSIS — K31.84 GASTROPARESIS: ICD-10-CM

## 2023-05-08 DIAGNOSIS — R13.19 ESOPHAGEAL DYSPHAGIA: ICD-10-CM

## 2023-05-08 PROCEDURE — 99214 OFFICE O/P EST MOD 30 MIN: CPT | Performed by: INTERNAL MEDICINE

## 2023-05-08 RX ORDER — FAMOTIDINE 40 MG/1
1 TABLET TABLET, FILM COATED ORAL ONCE A DAY
Qty: 30 TABLET | Refills: 3 | Status: ACTIVE | COMMUNITY
Start: 2022-05-16

## 2023-05-08 RX ORDER — PANTOPRAZOLE SODIUM 40 MG/1
1 TABLET TABLET, DELAYED RELEASE ORAL TWICE A DAY
Qty: 60 | Refills: 2 | Status: ACTIVE | COMMUNITY

## 2023-05-08 RX ORDER — ESOMEPRAZOLE MAGNESIUM 20 MG/1
1 CAPSULE CAPSULE, DELAYED RELEASE ORAL ONCE A DAY
Qty: 30 | Status: ACTIVE | COMMUNITY
Start: 2022-06-17

## 2023-05-08 RX ORDER — METHYLPREDNISOLONE 4 MG/1
5 PILLS FIRST DAY, THEN 4 PILLS SECOND DAY, THEN 3 PILLS FOR A DAY, THEN 2 PILL A DAY, THEN 1 PILL A DAY TABLET ORAL
Qty: 15 | Refills: 0 | Status: ACTIVE | COMMUNITY

## 2023-05-08 RX ORDER — PROGESTERONE 100 MG/1
CAPSULE ORAL
Status: ACTIVE | COMMUNITY
Start: 2020-03-16

## 2023-05-08 RX ORDER — OLOPATADINE HYDROCHLORIDE 665 UG/1
2 SPRAYS IN EACH NOSTRIL SPRAY NASAL TWICE A DAY
Status: ACTIVE | COMMUNITY

## 2023-05-08 RX ORDER — CANDESARTAN CILEXETIL 16 MG/1
TABLET ORAL
Status: ACTIVE | COMMUNITY
Start: 2020-03-16

## 2023-05-08 RX ORDER — FLUTICASONE PROPIONATE 50 UG/1
SPRAY, METERED NASAL
Status: ACTIVE | COMMUNITY
Start: 2020-03-16

## 2023-05-08 RX ORDER — ESOMEPRAZOLE MAGNESIUM 40 MG/1
1 CAPSULE CAPSULE, DELAYED RELEASE ORAL ONCE A DAY
Status: ACTIVE | COMMUNITY

## 2023-05-08 RX ORDER — ESTRADIOL 0.03 MG/D
1 PATCH TO SKIN PATCH TRANSDERMAL
Status: ACTIVE | COMMUNITY

## 2023-05-08 RX ORDER — BETAMETHASONE DIPROPIONATE 0.5 MG/G
CREAM TOPICAL
Status: ACTIVE | COMMUNITY
Start: 2020-03-16

## 2023-05-08 RX ORDER — FAMOTIDINE 40 MG/1
1 TABLET AT BEDTIME TABLET, FILM COATED ORAL ONCE A DAY
Qty: 30 | Status: ACTIVE | COMMUNITY
Start: 2022-06-17

## 2023-05-08 RX ORDER — ESOMEPRAZOLE MAGNESIUM 40 MG/1
CAPSULE, DELAYED RELEASE ORAL
Status: ACTIVE | COMMUNITY
Start: 2020-03-16

## 2023-05-08 RX ORDER — DOCUSATE SODIUM 100 MG/1
1 CAPSULE AS NEEDED CAPSULE, LIQUID FILLED ORAL ONCE A DAY
Status: ACTIVE | COMMUNITY

## 2023-05-08 RX ORDER — ESOMEPRAZOLE MAGNESIUM 40 MG
1 CAPSULE BY MOUTH ONCE A DAY CAPSULE,DELAYED RELEASE (ENTERIC COATED) ORAL
Status: ACTIVE | COMMUNITY
Start: 2020-12-15

## 2023-05-08 RX ORDER — OLOPATADINE HYDROCHLORIDE 600 UG/1
SPRAY, METERED NASAL
Status: ACTIVE | COMMUNITY
Start: 2020-03-16

## 2023-05-08 RX ORDER — ESTRADIOL 0.03 MG/D
FILM, EXTENDED RELEASE TRANSDERMAL
Status: ACTIVE | COMMUNITY
Start: 2020-03-16

## 2023-05-08 RX ORDER — ESOMEPRAZOLE MAGNESIUM 20 MG
TAKE 1 CAPSULE BY MOUTH ONCE A DAY CAPSULE,DELAYED RELEASE (ENTERIC COATED) ORAL
Status: ACTIVE | COMMUNITY
Start: 2021-08-09

## 2023-05-08 RX ORDER — METOCLOPRAMIDE HYDROCHLORIDE 5 MG/1
TABLET ORAL
Status: ACTIVE | COMMUNITY
Start: 2020-03-16

## 2023-05-08 RX ORDER — BUPROPION HYDROCHLORIDE 300 MG/1
TABLET, EXTENDED RELEASE ORAL
Status: ACTIVE | COMMUNITY
Start: 2020-03-16

## 2023-05-08 RX ORDER — AMITRIPTYLINE HYDROCHLORIDE 10 MG/1
5MG PO QHS X1WEEK 10MG PO QHS X1WEEK 15MGPO QHS X1WEEK 20MGPO QHS X1WEEK TABLET, FILM COATED ORAL
Status: ACTIVE | COMMUNITY
Start: 2021-12-10

## 2023-05-08 RX ORDER — ELECTROLYTES/DEXTROSE
SOLUTION, ORAL ORAL
Status: ACTIVE | COMMUNITY
Start: 2020-03-16

## 2023-05-08 RX ORDER — ONDANSETRON 4 MG/1
1 DOSE PO/SL Q4HR PRN NAUSEA TABLET, ORALLY DISINTEGRATING ORAL
Qty: 60 | Refills: 3 | Status: ACTIVE | COMMUNITY
Start: 2019-07-12

## 2023-05-08 RX ORDER — CHROMIUM 200 MCG
TABLET ORAL
Status: ACTIVE | COMMUNITY
Start: 2021-07-12

## 2023-05-08 RX ORDER — CLINDAMYCIN PHOSPHATE 150 MG/ML
INJECTION, SOLUTION INTRAMUSCULAR; INTRAVENOUS
Status: ACTIVE | COMMUNITY
Start: 2020-03-16

## 2023-05-08 RX ORDER — BACLOFEN 10 MG/1
TAKE 1 TABLET BY MOUTH TWICE A DAY TABLET ORAL
Status: ACTIVE | COMMUNITY
Start: 2020-03-16

## 2023-05-08 RX ORDER — ESTRADIOL 10 UG/1
INSERT VAGINAL
Status: ACTIVE | COMMUNITY
Start: 2020-03-16

## 2023-05-08 RX ORDER — FAMOTIDINE 10 MG
TABLET ORAL
Status: ACTIVE | COMMUNITY
Start: 2020-03-16

## 2023-05-08 NOTE — HPI-TODAY'S VISIT:
This is a 58-year-old female seen via telehealth for her upper esophageal symptoms.  She has previously undergone evaluation with a manometry that suggested a GJ outflow obstruction.  A timed barium swallow showed delayed emptying.  There was a suggestion of possible evolving achalasia.  We discussed Botox previously but she had deferred.  Her sister has psoriasis eczema and scleroderma.  She had COVID in January and is being evaluated by Dr. Alexander for persistent breathing issues.  However pulmonary function tests were normal she underwent sinus CT scan which was normal exercise tolerance is improving.  She still feels some dysphagia for dry foods.  She is slowly getting better.  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  She would like to consider Botox.  She does feel she has reflux and is increased her Nexium to twice a day that does seem to be doing somewhat better

## 2023-06-09 ENCOUNTER — OFFICE VISIT (OUTPATIENT)
Dept: URBAN - METROPOLITAN AREA CLINIC 27 | Facility: CLINIC | Age: 58
End: 2023-06-09

## 2023-06-15 ENCOUNTER — OFFICE VISIT (OUTPATIENT)
Dept: URBAN - METROPOLITAN AREA CLINIC 27 | Facility: CLINIC | Age: 58
End: 2023-06-15
Payer: COMMERCIAL

## 2023-06-15 VITALS
HEIGHT: 63 IN | RESPIRATION RATE: 17 BRPM | WEIGHT: 125 LBS | DIASTOLIC BLOOD PRESSURE: 92 MMHG | BODY MASS INDEX: 22.15 KG/M2 | HEART RATE: 87 BPM | SYSTOLIC BLOOD PRESSURE: 122 MMHG

## 2023-06-15 DIAGNOSIS — R13.19 ESOPHAGEAL DYSPHAGIA: ICD-10-CM

## 2023-06-15 DIAGNOSIS — K21.9 GERD: ICD-10-CM

## 2023-06-15 DIAGNOSIS — K31.84 GASTROPARESIS: ICD-10-CM

## 2023-06-15 DIAGNOSIS — K58.8 OTHER IRRITABLE BOWEL SYNDROME: ICD-10-CM

## 2023-06-15 PROCEDURE — 99214 OFFICE O/P EST MOD 30 MIN: CPT | Performed by: INTERNAL MEDICINE

## 2023-06-15 RX ORDER — OLOPATADINE HYDROCHLORIDE 600 UG/1
SPRAY, METERED NASAL
Status: ACTIVE | COMMUNITY
Start: 2020-03-16

## 2023-06-15 RX ORDER — CLINDAMYCIN PHOSPHATE 150 MG/ML
INJECTION, SOLUTION INTRAMUSCULAR; INTRAVENOUS
Status: ACTIVE | COMMUNITY
Start: 2020-03-16

## 2023-06-15 RX ORDER — ESOMEPRAZOLE MAGNESIUM 40 MG/1
1 CAPSULE CAPSULE, DELAYED RELEASE ORAL ONCE A DAY
Status: ACTIVE | COMMUNITY

## 2023-06-15 RX ORDER — AMITRIPTYLINE HYDROCHLORIDE 10 MG/1
5MG PO QHS X1WEEK 10MG PO QHS X1WEEK 15MGPO QHS X1WEEK 20MGPO QHS X1WEEK TABLET, FILM COATED ORAL
Status: ACTIVE | COMMUNITY
Start: 2021-12-10

## 2023-06-15 RX ORDER — FLUTICASONE PROPIONATE 50 UG/1
SPRAY, METERED NASAL
Status: ACTIVE | COMMUNITY
Start: 2020-03-16

## 2023-06-15 RX ORDER — ESOMEPRAZOLE MAGNESIUM 20 MG
TAKE 1 CAPSULE BY MOUTH ONCE A DAY CAPSULE,DELAYED RELEASE (ENTERIC COATED) ORAL
Status: ACTIVE | COMMUNITY
Start: 2021-08-09

## 2023-06-15 RX ORDER — FAMOTIDINE 10 MG
TABLET ORAL
Status: ACTIVE | COMMUNITY
Start: 2020-03-16

## 2023-06-15 RX ORDER — CANDESARTAN CILEXETIL 16 MG/1
TABLET ORAL
Status: ACTIVE | COMMUNITY
Start: 2020-03-16

## 2023-06-15 RX ORDER — CHROMIUM 200 MCG
TABLET ORAL
Status: ACTIVE | COMMUNITY
Start: 2021-07-12

## 2023-06-15 RX ORDER — FAMOTIDINE 40 MG/1
1 TABLET TABLET, FILM COATED ORAL ONCE A DAY
Qty: 30 TABLET | Refills: 3 | Status: ACTIVE | COMMUNITY
Start: 2022-05-16

## 2023-06-15 RX ORDER — BACLOFEN 10 MG/1
TAKE 1 TABLET BY MOUTH TWICE A DAY TABLET ORAL
Status: ACTIVE | COMMUNITY
Start: 2020-03-16

## 2023-06-15 RX ORDER — BUPROPION HYDROCHLORIDE 300 MG/1
TABLET, EXTENDED RELEASE ORAL
Status: ACTIVE | COMMUNITY
Start: 2020-03-16

## 2023-06-15 RX ORDER — ESTRADIOL 0.03 MG/D
1 PATCH TO SKIN PATCH TRANSDERMAL
Status: ACTIVE | COMMUNITY

## 2023-06-15 RX ORDER — ESOMEPRAZOLE MAGNESIUM 40 MG
1 CAPSULE BY MOUTH ONCE A DAY CAPSULE,DELAYED RELEASE (ENTERIC COATED) ORAL
Status: ACTIVE | COMMUNITY
Start: 2020-12-15

## 2023-06-15 RX ORDER — PROGESTERONE 100 MG/1
CAPSULE ORAL
Status: ACTIVE | COMMUNITY
Start: 2020-03-16

## 2023-06-15 RX ORDER — BETAMETHASONE DIPROPIONATE 0.5 MG/G
CREAM TOPICAL
Status: ACTIVE | COMMUNITY
Start: 2020-03-16

## 2023-06-15 RX ORDER — ESTRADIOL 10 UG/1
INSERT VAGINAL
Status: ACTIVE | COMMUNITY
Start: 2020-03-16

## 2023-06-15 RX ORDER — OLOPATADINE HYDROCHLORIDE 665 UG/1
2 SPRAYS IN EACH NOSTRIL SPRAY NASAL TWICE A DAY
Status: ACTIVE | COMMUNITY

## 2023-06-15 RX ORDER — FAMOTIDINE 40 MG/1
1 TABLET AT BEDTIME TABLET, FILM COATED ORAL ONCE A DAY
Qty: 30 | Status: ACTIVE | COMMUNITY
Start: 2022-06-17

## 2023-06-15 RX ORDER — METHYLPREDNISOLONE 4 MG/1
5 PILLS FIRST DAY, THEN 4 PILLS SECOND DAY, THEN 3 PILLS FOR A DAY, THEN 2 PILL A DAY, THEN 1 PILL A DAY TABLET ORAL
Qty: 15 | Refills: 0 | Status: ACTIVE | COMMUNITY

## 2023-06-15 RX ORDER — ESTRADIOL 0.03 MG/D
FILM, EXTENDED RELEASE TRANSDERMAL
Status: ACTIVE | COMMUNITY
Start: 2020-03-16

## 2023-06-15 RX ORDER — METOCLOPRAMIDE HYDROCHLORIDE 5 MG/1
TABLET ORAL
Status: ACTIVE | COMMUNITY
Start: 2020-03-16

## 2023-06-15 RX ORDER — ESOMEPRAZOLE MAGNESIUM 40 MG/1
CAPSULE, DELAYED RELEASE ORAL
Status: ACTIVE | COMMUNITY
Start: 2020-03-16

## 2023-06-15 RX ORDER — ELECTROLYTES/DEXTROSE
SOLUTION, ORAL ORAL
Status: ACTIVE | COMMUNITY
Start: 2020-03-16

## 2023-06-15 RX ORDER — PANTOPRAZOLE SODIUM 40 MG/1
1 TABLET TABLET, DELAYED RELEASE ORAL TWICE A DAY
Qty: 60 | Refills: 2 | Status: ACTIVE | COMMUNITY

## 2023-06-15 RX ORDER — ONDANSETRON 4 MG/1
1 DOSE PO/SL Q4HR PRN NAUSEA TABLET, ORALLY DISINTEGRATING ORAL
Qty: 60 | Refills: 3 | Status: ACTIVE | COMMUNITY
Start: 2019-07-12

## 2023-06-15 RX ORDER — ESOMEPRAZOLE MAGNESIUM 20 MG/1
1 CAPSULE CAPSULE, DELAYED RELEASE ORAL ONCE A DAY
Qty: 30 | Status: ACTIVE | COMMUNITY
Start: 2022-06-17

## 2023-06-15 RX ORDER — DOCUSATE SODIUM 100 MG/1
1 CAPSULE AS NEEDED CAPSULE, LIQUID FILLED ORAL ONCE A DAY
Status: ACTIVE | COMMUNITY

## 2023-06-15 NOTE — HPI-TODAY'S VISIT:
This is a 58-year-old female seen in follow-up consultation for her dyspepsia and reflux symptom.  She is being treated for long COVID.  Her sister has scleroderma and she is concerned about symptoms.  She had a previous manometry with EGJ outflow obstruction.  It was unclear whether this was an evolving achalasia.  Her symptoms are improved but continue.  She had a repeat timed barium swallow that showed improvement over the delay that was seen a few years ago.  She takes Nexium in the morning and famotidine at night.  Her swallowing is generally okay although dry foods will sometimes cause an issue and get stuck.  She has mild constipation as well she is seeing a therapist and psychiatrist.  She has had citalopram added to her Wellbutrin.  She is not sure if stress plays a role.  She is taken off teaching for the summer.  She is concerned about the previous manometry findings and the fact that her sister has scleroderma.

## 2023-06-16 ENCOUNTER — LAB OUTSIDE AN ENCOUNTER (OUTPATIENT)
Dept: URBAN - METROPOLITAN AREA CLINIC 27 | Facility: CLINIC | Age: 58
End: 2023-06-16

## 2023-06-21 ENCOUNTER — WEB ENCOUNTER (OUTPATIENT)
Dept: URBAN - METROPOLITAN AREA CLINIC 27 | Facility: CLINIC | Age: 58
End: 2023-06-21

## 2023-06-21 ENCOUNTER — WEB ENCOUNTER (OUTPATIENT)
Dept: URBAN - METROPOLITAN AREA SURGERY CENTER 7 | Facility: SURGERY CENTER | Age: 58
End: 2023-06-21

## 2023-06-22 ENCOUNTER — OFFICE VISIT (OUTPATIENT)
Dept: URBAN - METROPOLITAN AREA MEDICAL CENTER 8 | Facility: MEDICAL CENTER | Age: 58
End: 2023-06-22
Payer: COMMERCIAL

## 2023-06-22 DIAGNOSIS — R13.19 CERVICAL DYSPHAGIA: ICD-10-CM

## 2023-06-22 DIAGNOSIS — K22.89 OTHER SPECIFIED DISEASE OF ESOPHAGUS: ICD-10-CM

## 2023-06-22 DIAGNOSIS — K21.9 ACID REFLUX: ICD-10-CM

## 2023-06-22 PROCEDURE — 91010 ESOPHAGUS MOTILITY STUDY: CPT | Performed by: INTERNAL MEDICINE

## 2023-06-22 RX ORDER — METHYLPREDNISOLONE 4 MG/1
5 PILLS FIRST DAY, THEN 4 PILLS SECOND DAY, THEN 3 PILLS FOR A DAY, THEN 2 PILL A DAY, THEN 1 PILL A DAY TABLET ORAL
Qty: 15 | Refills: 0 | Status: ACTIVE | COMMUNITY

## 2023-06-22 RX ORDER — OLOPATADINE HYDROCHLORIDE 665 UG/1
2 SPRAYS IN EACH NOSTRIL SPRAY NASAL TWICE A DAY
Status: ACTIVE | COMMUNITY

## 2023-06-22 RX ORDER — CLINDAMYCIN PHOSPHATE 150 MG/ML
INJECTION, SOLUTION INTRAMUSCULAR; INTRAVENOUS
Status: ACTIVE | COMMUNITY
Start: 2020-03-16

## 2023-06-22 RX ORDER — METOCLOPRAMIDE HYDROCHLORIDE 5 MG/1
TABLET ORAL
Status: ACTIVE | COMMUNITY
Start: 2020-03-16

## 2023-06-22 RX ORDER — ONDANSETRON 4 MG/1
1 DOSE PO/SL Q4HR PRN NAUSEA TABLET, ORALLY DISINTEGRATING ORAL
Qty: 60 | Refills: 3 | Status: ACTIVE | COMMUNITY
Start: 2019-07-12

## 2023-06-22 RX ORDER — BETAMETHASONE DIPROPIONATE 0.5 MG/G
CREAM TOPICAL
Status: ACTIVE | COMMUNITY
Start: 2020-03-16

## 2023-06-22 RX ORDER — ESTRADIOL 0.03 MG/D
FILM, EXTENDED RELEASE TRANSDERMAL
Status: ACTIVE | COMMUNITY
Start: 2020-03-16

## 2023-06-22 RX ORDER — CHROMIUM 200 MCG
TABLET ORAL
Status: ACTIVE | COMMUNITY
Start: 2021-07-12

## 2023-06-22 RX ORDER — ESOMEPRAZOLE MAGNESIUM 20 MG/1
1 CAPSULE CAPSULE, DELAYED RELEASE ORAL ONCE A DAY
Qty: 30 | Status: ACTIVE | COMMUNITY
Start: 2022-06-17

## 2023-06-22 RX ORDER — FAMOTIDINE 40 MG/1
1 TABLET AT BEDTIME TABLET, FILM COATED ORAL ONCE A DAY
Qty: 30 | Status: ACTIVE | COMMUNITY
Start: 2022-06-17

## 2023-06-22 RX ORDER — BUPROPION HYDROCHLORIDE 300 MG/1
TABLET, EXTENDED RELEASE ORAL
Status: ACTIVE | COMMUNITY
Start: 2020-03-16

## 2023-06-22 RX ORDER — BACLOFEN 10 MG/1
TAKE 1 TABLET BY MOUTH TWICE A DAY TABLET ORAL
Status: ACTIVE | COMMUNITY
Start: 2020-03-16

## 2023-06-22 RX ORDER — ESOMEPRAZOLE MAGNESIUM 40 MG/1
CAPSULE, DELAYED RELEASE ORAL
Status: ACTIVE | COMMUNITY
Start: 2020-03-16

## 2023-06-22 RX ORDER — FLUTICASONE PROPIONATE 50 UG/1
SPRAY, METERED NASAL
Status: ACTIVE | COMMUNITY
Start: 2020-03-16

## 2023-06-22 RX ORDER — PROGESTERONE 100 MG/1
CAPSULE ORAL
Status: ACTIVE | COMMUNITY
Start: 2020-03-16

## 2023-06-22 RX ORDER — DOCUSATE SODIUM 100 MG/1
1 CAPSULE AS NEEDED CAPSULE, LIQUID FILLED ORAL ONCE A DAY
Status: ACTIVE | COMMUNITY

## 2023-06-22 RX ORDER — AMITRIPTYLINE HYDROCHLORIDE 10 MG/1
5MG PO QHS X1WEEK 10MG PO QHS X1WEEK 15MGPO QHS X1WEEK 20MGPO QHS X1WEEK TABLET, FILM COATED ORAL
Status: ACTIVE | COMMUNITY
Start: 2021-12-10

## 2023-06-22 RX ORDER — FAMOTIDINE 10 MG
TABLET ORAL
Status: ACTIVE | COMMUNITY
Start: 2020-03-16

## 2023-06-22 RX ORDER — PANTOPRAZOLE SODIUM 40 MG/1
1 TABLET TABLET, DELAYED RELEASE ORAL TWICE A DAY
Qty: 60 | Refills: 2 | Status: ACTIVE | COMMUNITY

## 2023-06-22 RX ORDER — ESOMEPRAZOLE MAGNESIUM 40 MG
1 CAPSULE BY MOUTH ONCE A DAY CAPSULE,DELAYED RELEASE (ENTERIC COATED) ORAL
Status: ACTIVE | COMMUNITY
Start: 2020-12-15

## 2023-06-22 RX ORDER — ESTRADIOL 0.03 MG/D
1 PATCH TO SKIN PATCH TRANSDERMAL
Status: ACTIVE | COMMUNITY

## 2023-06-22 RX ORDER — ESOMEPRAZOLE MAGNESIUM 20 MG
TAKE 1 CAPSULE BY MOUTH ONCE A DAY CAPSULE,DELAYED RELEASE (ENTERIC COATED) ORAL
Status: ACTIVE | COMMUNITY
Start: 2021-08-09

## 2023-06-22 RX ORDER — ESTRADIOL 10 UG/1
INSERT VAGINAL
Status: ACTIVE | COMMUNITY
Start: 2020-03-16

## 2023-06-22 RX ORDER — CANDESARTAN CILEXETIL 16 MG/1
TABLET ORAL
Status: ACTIVE | COMMUNITY
Start: 2020-03-16

## 2023-06-22 RX ORDER — ESOMEPRAZOLE MAGNESIUM 40 MG/1
1 CAPSULE CAPSULE, DELAYED RELEASE ORAL ONCE A DAY
Status: ACTIVE | COMMUNITY

## 2023-06-22 RX ORDER — OLOPATADINE HYDROCHLORIDE 600 UG/1
SPRAY, METERED NASAL
Status: ACTIVE | COMMUNITY
Start: 2020-03-16

## 2023-06-22 RX ORDER — ELECTROLYTES/DEXTROSE
SOLUTION, ORAL ORAL
Status: ACTIVE | COMMUNITY
Start: 2020-03-16

## 2023-06-22 RX ORDER — FAMOTIDINE 40 MG/1
1 TABLET TABLET, FILM COATED ORAL ONCE A DAY
Qty: 30 TABLET | Refills: 3 | Status: ACTIVE | COMMUNITY
Start: 2022-05-16

## 2023-06-26 ENCOUNTER — CLAIMS CREATED FROM THE CLAIM WINDOW (OUTPATIENT)
Dept: URBAN - METROPOLITAN AREA SURGERY CENTER 7 | Facility: SURGERY CENTER | Age: 58
End: 2023-06-26
Payer: COMMERCIAL

## 2023-06-26 ENCOUNTER — CLAIMS CREATED FROM THE CLAIM WINDOW (OUTPATIENT)
Dept: URBAN - METROPOLITAN AREA CLINIC 4 | Facility: CLINIC | Age: 58
End: 2023-06-26
Payer: COMMERCIAL

## 2023-06-26 DIAGNOSIS — K21.9 ACID REFLUX: ICD-10-CM

## 2023-06-26 DIAGNOSIS — K31.89 ACQUIRED DEFORMITY OF DUODENUM: ICD-10-CM

## 2023-06-26 DIAGNOSIS — R13.19 CERVICAL DYSPHAGIA: ICD-10-CM

## 2023-06-26 DIAGNOSIS — K29.70 GASTRITIS, UNSPECIFIED, WITHOUT BLEEDING: ICD-10-CM

## 2023-06-26 PROCEDURE — 43248 EGD GUIDE WIRE INSERTION: CPT | Performed by: INTERNAL MEDICINE

## 2023-06-26 PROCEDURE — G8907 PT DOC NO EVENTS ON DISCHARG: HCPCS | Performed by: INTERNAL MEDICINE

## 2023-06-26 PROCEDURE — 43239 EGD BIOPSY SINGLE/MULTIPLE: CPT | Performed by: INTERNAL MEDICINE

## 2023-06-26 PROCEDURE — 88305 TISSUE EXAM BY PATHOLOGIST: CPT | Performed by: PATHOLOGY

## 2023-06-26 PROCEDURE — 88312 SPECIAL STAINS GROUP 1: CPT | Performed by: PATHOLOGY

## 2023-06-26 RX ORDER — FLUTICASONE PROPIONATE 50 UG/1
SPRAY, METERED NASAL
Status: ACTIVE | COMMUNITY
Start: 2020-03-16

## 2023-06-26 RX ORDER — CLINDAMYCIN PHOSPHATE 150 MG/ML
INJECTION, SOLUTION INTRAMUSCULAR; INTRAVENOUS
Status: ACTIVE | COMMUNITY
Start: 2020-03-16

## 2023-06-26 RX ORDER — METOCLOPRAMIDE HYDROCHLORIDE 5 MG/1
TABLET ORAL
Status: ACTIVE | COMMUNITY
Start: 2020-03-16

## 2023-06-26 RX ORDER — ELECTROLYTES/DEXTROSE
SOLUTION, ORAL ORAL
Status: ACTIVE | COMMUNITY
Start: 2020-03-16

## 2023-06-26 RX ORDER — METHYLPREDNISOLONE 4 MG/1
5 PILLS FIRST DAY, THEN 4 PILLS SECOND DAY, THEN 3 PILLS FOR A DAY, THEN 2 PILL A DAY, THEN 1 PILL A DAY TABLET ORAL
Qty: 15 | Refills: 0 | Status: ACTIVE | COMMUNITY

## 2023-06-26 RX ORDER — BACLOFEN 10 MG/1
TAKE 1 TABLET BY MOUTH TWICE A DAY TABLET ORAL
Status: ACTIVE | COMMUNITY
Start: 2020-03-16

## 2023-06-26 RX ORDER — PANTOPRAZOLE SODIUM 40 MG/1
1 TABLET TABLET, DELAYED RELEASE ORAL TWICE A DAY
Qty: 60 | Refills: 2 | Status: ACTIVE | COMMUNITY

## 2023-06-26 RX ORDER — CANDESARTAN CILEXETIL 16 MG/1
TABLET ORAL
Status: ACTIVE | COMMUNITY
Start: 2020-03-16

## 2023-06-26 RX ORDER — ESTRADIOL 10 UG/1
INSERT VAGINAL
Status: ACTIVE | COMMUNITY
Start: 2020-03-16

## 2023-06-26 RX ORDER — BETAMETHASONE DIPROPIONATE 0.5 MG/G
CREAM TOPICAL
Status: ACTIVE | COMMUNITY
Start: 2020-03-16

## 2023-06-26 RX ORDER — CHROMIUM 200 MCG
TABLET ORAL
Status: ACTIVE | COMMUNITY
Start: 2021-07-12

## 2023-06-26 RX ORDER — FAMOTIDINE 10 MG
TABLET ORAL
Status: ACTIVE | COMMUNITY
Start: 2020-03-16

## 2023-06-26 RX ORDER — ESOMEPRAZOLE MAGNESIUM 40 MG
1 CAPSULE BY MOUTH ONCE A DAY CAPSULE,DELAYED RELEASE (ENTERIC COATED) ORAL
Status: ACTIVE | COMMUNITY
Start: 2020-12-15

## 2023-06-26 RX ORDER — FAMOTIDINE 40 MG/1
1 TABLET AT BEDTIME TABLET, FILM COATED ORAL ONCE A DAY
Qty: 30 | Status: ACTIVE | COMMUNITY
Start: 2022-06-17

## 2023-06-26 RX ORDER — PROGESTERONE 100 MG/1
CAPSULE ORAL
Status: ACTIVE | COMMUNITY
Start: 2020-03-16

## 2023-06-26 RX ORDER — ESOMEPRAZOLE MAGNESIUM 20 MG/1
1 CAPSULE CAPSULE, DELAYED RELEASE ORAL ONCE A DAY
Qty: 30 | Status: ACTIVE | COMMUNITY
Start: 2022-06-17

## 2023-06-26 RX ORDER — OLOPATADINE HYDROCHLORIDE 665 UG/1
2 SPRAYS IN EACH NOSTRIL SPRAY NASAL TWICE A DAY
Status: ACTIVE | COMMUNITY

## 2023-06-26 RX ORDER — DOCUSATE SODIUM 100 MG/1
1 CAPSULE AS NEEDED CAPSULE, LIQUID FILLED ORAL ONCE A DAY
Status: ACTIVE | COMMUNITY

## 2023-06-26 RX ORDER — ONDANSETRON 4 MG/1
1 DOSE PO/SL Q4HR PRN NAUSEA TABLET, ORALLY DISINTEGRATING ORAL
Qty: 60 | Refills: 3 | Status: ACTIVE | COMMUNITY
Start: 2019-07-12

## 2023-06-26 RX ORDER — AMITRIPTYLINE HYDROCHLORIDE 10 MG/1
5MG PO QHS X1WEEK 10MG PO QHS X1WEEK 15MGPO QHS X1WEEK 20MGPO QHS X1WEEK TABLET, FILM COATED ORAL
Status: ACTIVE | COMMUNITY
Start: 2021-12-10

## 2023-06-26 RX ORDER — OLOPATADINE HYDROCHLORIDE 600 UG/1
SPRAY, METERED NASAL
Status: ACTIVE | COMMUNITY
Start: 2020-03-16

## 2023-06-26 RX ORDER — FAMOTIDINE 40 MG/1
1 TABLET TABLET, FILM COATED ORAL ONCE A DAY
Qty: 30 TABLET | Refills: 3 | Status: ACTIVE | COMMUNITY
Start: 2022-05-16

## 2023-06-26 RX ORDER — ESOMEPRAZOLE MAGNESIUM 20 MG
TAKE 1 CAPSULE BY MOUTH ONCE A DAY CAPSULE,DELAYED RELEASE (ENTERIC COATED) ORAL
Status: ACTIVE | COMMUNITY
Start: 2021-08-09

## 2023-06-26 RX ORDER — BUPROPION HYDROCHLORIDE 300 MG/1
TABLET, EXTENDED RELEASE ORAL
Status: ACTIVE | COMMUNITY
Start: 2020-03-16

## 2023-06-26 RX ORDER — ESTRADIOL 0.03 MG/D
1 PATCH TO SKIN PATCH TRANSDERMAL
Status: ACTIVE | COMMUNITY

## 2023-06-26 RX ORDER — ESOMEPRAZOLE MAGNESIUM 40 MG/1
CAPSULE, DELAYED RELEASE ORAL
Status: ACTIVE | COMMUNITY
Start: 2020-03-16

## 2023-06-26 RX ORDER — ESTRADIOL 0.03 MG/D
FILM, EXTENDED RELEASE TRANSDERMAL
Status: ACTIVE | COMMUNITY
Start: 2020-03-16

## 2023-06-26 RX ORDER — ESOMEPRAZOLE MAGNESIUM 40 MG/1
1 CAPSULE CAPSULE, DELAYED RELEASE ORAL ONCE A DAY
Status: ACTIVE | COMMUNITY

## 2023-06-27 ENCOUNTER — WEB ENCOUNTER (OUTPATIENT)
Dept: URBAN - METROPOLITAN AREA CLINIC 27 | Facility: CLINIC | Age: 58
End: 2023-06-27

## 2023-06-27 ENCOUNTER — TELEPHONE ENCOUNTER (OUTPATIENT)
Dept: URBAN - METROPOLITAN AREA CLINIC 27 | Facility: CLINIC | Age: 58
End: 2023-06-27

## 2023-06-28 ENCOUNTER — WEB ENCOUNTER (OUTPATIENT)
Dept: URBAN - METROPOLITAN AREA CLINIC 27 | Facility: CLINIC | Age: 58
End: 2023-06-28

## 2023-07-21 ENCOUNTER — OFFICE VISIT (OUTPATIENT)
Dept: URBAN - METROPOLITAN AREA CLINIC 27 | Facility: CLINIC | Age: 58
End: 2023-07-21
Payer: COMMERCIAL

## 2023-07-21 VITALS
DIASTOLIC BLOOD PRESSURE: 88 MMHG | WEIGHT: 120 LBS | BODY MASS INDEX: 21.26 KG/M2 | SYSTOLIC BLOOD PRESSURE: 134 MMHG | HEIGHT: 63 IN | HEART RATE: 77 BPM

## 2023-07-21 DIAGNOSIS — K58.8 OTHER IRRITABLE BOWEL SYNDROME: ICD-10-CM

## 2023-07-21 DIAGNOSIS — R13.19 ESOPHAGEAL DYSPHAGIA: ICD-10-CM

## 2023-07-21 DIAGNOSIS — K21.9 GERD: ICD-10-CM

## 2023-07-21 DIAGNOSIS — K31.84 GASTROPARESIS: ICD-10-CM

## 2023-07-21 PROCEDURE — 99213 OFFICE O/P EST LOW 20 MIN: CPT | Performed by: INTERNAL MEDICINE

## 2023-07-21 RX ORDER — PANTOPRAZOLE SODIUM 40 MG/1
1 TABLET TABLET, DELAYED RELEASE ORAL TWICE A DAY
Qty: 60 | Refills: 2 | Status: ACTIVE | COMMUNITY

## 2023-07-21 RX ORDER — METOCLOPRAMIDE HYDROCHLORIDE 5 MG/1
TABLET ORAL
Status: ACTIVE | COMMUNITY
Start: 2020-03-16

## 2023-07-21 RX ORDER — ONDANSETRON 4 MG/1
1 DOSE PO/SL Q4HR PRN NAUSEA TABLET, ORALLY DISINTEGRATING ORAL
Qty: 60 | Refills: 3 | Status: ACTIVE | COMMUNITY
Start: 2019-07-12

## 2023-07-21 RX ORDER — FLUTICASONE PROPIONATE 50 UG/1
SPRAY, METERED NASAL
Status: ACTIVE | COMMUNITY
Start: 2020-03-16

## 2023-07-21 RX ORDER — FAMOTIDINE 10 MG
TABLET ORAL
Status: ACTIVE | COMMUNITY
Start: 2020-03-16

## 2023-07-21 RX ORDER — BACLOFEN 10 MG/1
TAKE 1 TABLET BY MOUTH TWICE A DAY TABLET ORAL
Status: ACTIVE | COMMUNITY
Start: 2020-03-16

## 2023-07-21 RX ORDER — ESOMEPRAZOLE MAGNESIUM 40 MG/1
CAPSULE, DELAYED RELEASE ORAL
Status: ACTIVE | COMMUNITY
Start: 2020-03-16

## 2023-07-21 RX ORDER — ELECTROLYTES/DEXTROSE
SOLUTION, ORAL ORAL
Status: ACTIVE | COMMUNITY
Start: 2020-03-16

## 2023-07-21 RX ORDER — METHYLPREDNISOLONE 4 MG/1
5 PILLS FIRST DAY, THEN 4 PILLS SECOND DAY, THEN 3 PILLS FOR A DAY, THEN 2 PILL A DAY, THEN 1 PILL A DAY TABLET ORAL
Qty: 15 | Refills: 0 | Status: ACTIVE | COMMUNITY

## 2023-07-21 RX ORDER — ESOMEPRAZOLE MAGNESIUM 40 MG
1 CAPSULE BY MOUTH ONCE A DAY CAPSULE,DELAYED RELEASE (ENTERIC COATED) ORAL
Status: ACTIVE | COMMUNITY
Start: 2020-12-15

## 2023-07-21 RX ORDER — ESOMEPRAZOLE MAGNESIUM 20 MG
TAKE 1 CAPSULE BY MOUTH ONCE A DAY CAPSULE,DELAYED RELEASE (ENTERIC COATED) ORAL
Status: ACTIVE | COMMUNITY
Start: 2021-08-09

## 2023-07-21 RX ORDER — CLINDAMYCIN PHOSPHATE 150 MG/ML
INJECTION, SOLUTION INTRAMUSCULAR; INTRAVENOUS
Status: ACTIVE | COMMUNITY
Start: 2020-03-16

## 2023-07-21 RX ORDER — ESOMEPRAZOLE MAGNESIUM 20 MG/1
1 CAPSULE CAPSULE, DELAYED RELEASE ORAL ONCE A DAY
Qty: 30 | Status: ACTIVE | COMMUNITY
Start: 2022-06-17

## 2023-07-21 RX ORDER — ESOMEPRAZOLE MAGNESIUM 40 MG/1
1 CAPSULE CAPSULE, DELAYED RELEASE ORAL ONCE A DAY
Status: ACTIVE | COMMUNITY

## 2023-07-21 RX ORDER — ESTRADIOL 0.03 MG/D
FILM, EXTENDED RELEASE TRANSDERMAL
Status: ACTIVE | COMMUNITY
Start: 2020-03-16

## 2023-07-21 RX ORDER — BUPROPION HYDROCHLORIDE 300 MG/1
TABLET, EXTENDED RELEASE ORAL
Status: ACTIVE | COMMUNITY
Start: 2020-03-16

## 2023-07-21 RX ORDER — CHROMIUM 200 MCG
TABLET ORAL
Status: ACTIVE | COMMUNITY
Start: 2021-07-12

## 2023-07-21 RX ORDER — CANDESARTAN CILEXETIL 16 MG/1
TABLET ORAL
Status: ACTIVE | COMMUNITY
Start: 2020-03-16

## 2023-07-21 RX ORDER — FAMOTIDINE 40 MG/1
1 TABLET AT BEDTIME TABLET, FILM COATED ORAL ONCE A DAY
Qty: 30 | Status: ACTIVE | COMMUNITY
Start: 2022-06-17

## 2023-07-21 RX ORDER — OLOPATADINE HYDROCHLORIDE 665 UG/1
2 SPRAYS IN EACH NOSTRIL SPRAY NASAL TWICE A DAY
Status: ACTIVE | COMMUNITY

## 2023-07-21 RX ORDER — AMITRIPTYLINE HYDROCHLORIDE 10 MG/1
5MG PO QHS X1WEEK 10MG PO QHS X1WEEK 15MGPO QHS X1WEEK 20MGPO QHS X1WEEK TABLET, FILM COATED ORAL
Status: ACTIVE | COMMUNITY
Start: 2021-12-10

## 2023-07-21 RX ORDER — DOCUSATE SODIUM 100 MG/1
1 CAPSULE AS NEEDED CAPSULE, LIQUID FILLED ORAL ONCE A DAY
Status: ACTIVE | COMMUNITY

## 2023-07-21 RX ORDER — PROGESTERONE 100 MG/1
CAPSULE ORAL
Status: ACTIVE | COMMUNITY
Start: 2020-03-16

## 2023-07-21 RX ORDER — BETAMETHASONE DIPROPIONATE 0.5 MG/G
CREAM TOPICAL
Status: ACTIVE | COMMUNITY
Start: 2020-03-16

## 2023-07-21 RX ORDER — ESTRADIOL 0.03 MG/D
1 PATCH TO SKIN PATCH TRANSDERMAL
Status: ACTIVE | COMMUNITY

## 2023-07-21 RX ORDER — OLOPATADINE HYDROCHLORIDE 600 UG/1
SPRAY, METERED NASAL
Status: ACTIVE | COMMUNITY
Start: 2020-03-16

## 2023-07-21 RX ORDER — ESTRADIOL 10 UG/1
INSERT VAGINAL
Status: ACTIVE | COMMUNITY
Start: 2020-03-16

## 2023-07-21 RX ORDER — FAMOTIDINE 40 MG/1
1 TABLET TABLET, FILM COATED ORAL ONCE A DAY
Qty: 30 TABLET | Refills: 3 | Status: ACTIVE | COMMUNITY
Start: 2022-05-16

## 2023-07-21 NOTE — HPI-TODAY'S VISIT:
This is a 58-year-old female seen in follow-up consultation for her swallowing issues.  She states that overall she is doing better.  Her swallowing is better.  Her acid reflux is better.  She takes Nexium in the morning 20 mg and famotidine 40 mg with dinner.  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  She is walking daily and exercising and feels that that is having a big impact on her improved symptoms.  She also has previous notation of gastric emptying delay.  She has recently increased her Cymbalta as well and is being treated by her psychologist.

## 2023-10-05 ENCOUNTER — OFFICE VISIT (OUTPATIENT)
Dept: URBAN - METROPOLITAN AREA TELEHEALTH 2 | Facility: TELEHEALTH | Age: 58
End: 2023-10-05

## 2023-10-12 ENCOUNTER — OFFICE VISIT (OUTPATIENT)
Dept: URBAN - METROPOLITAN AREA CLINIC 27 | Facility: CLINIC | Age: 58
End: 2023-10-12
Payer: COMMERCIAL

## 2023-10-12 VITALS
WEIGHT: 123 LBS | SYSTOLIC BLOOD PRESSURE: 150 MMHG | HEIGHT: 63 IN | HEART RATE: 81 BPM | DIASTOLIC BLOOD PRESSURE: 103 MMHG | BODY MASS INDEX: 21.79 KG/M2

## 2023-10-12 DIAGNOSIS — R13.19 ESOPHAGEAL DYSPHAGIA: ICD-10-CM

## 2023-10-12 DIAGNOSIS — K31.84 GASTROPARESIS: ICD-10-CM

## 2023-10-12 DIAGNOSIS — K21.9 GERD: ICD-10-CM

## 2023-10-12 DIAGNOSIS — R49.8 OTHER VOICE AND RESONANCE DISORDERS: ICD-10-CM

## 2023-10-12 DIAGNOSIS — K58.8 OTHER IRRITABLE BOWEL SYNDROME: ICD-10-CM

## 2023-10-12 PROCEDURE — 99213 OFFICE O/P EST LOW 20 MIN: CPT | Performed by: INTERNAL MEDICINE

## 2023-10-12 RX ORDER — ESTRADIOL 0.03 MG/D
1 PATCH TO SKIN PATCH TRANSDERMAL
Status: ACTIVE | COMMUNITY

## 2023-10-12 RX ORDER — ELECTROLYTES/DEXTROSE
SOLUTION, ORAL ORAL
Status: ACTIVE | COMMUNITY
Start: 2020-03-16

## 2023-10-12 RX ORDER — ESOMEPRAZOLE MAGNESIUM 20 MG
TAKE 1 CAPSULE BY MOUTH ONCE A DAY CAPSULE,DELAYED RELEASE (ENTERIC COATED) ORAL
Status: ACTIVE | COMMUNITY
Start: 2021-08-09

## 2023-10-12 RX ORDER — METOCLOPRAMIDE HYDROCHLORIDE 5 MG/1
TABLET ORAL
Status: ACTIVE | COMMUNITY
Start: 2020-03-16

## 2023-10-12 RX ORDER — FLUTICASONE PROPIONATE 50 UG/1
SPRAY, METERED NASAL
Status: ACTIVE | COMMUNITY
Start: 2020-03-16

## 2023-10-12 RX ORDER — OLOPATADINE HYDROCHLORIDE 600 UG/1
SPRAY, METERED NASAL
Status: ACTIVE | COMMUNITY
Start: 2020-03-16

## 2023-10-12 RX ORDER — FAMOTIDINE 10 MG
TABLET ORAL
Status: ACTIVE | COMMUNITY
Start: 2020-03-16

## 2023-10-12 RX ORDER — CHROMIUM 200 MCG
TABLET ORAL
Status: ACTIVE | COMMUNITY
Start: 2021-07-12

## 2023-10-12 RX ORDER — ESTRADIOL 0.03 MG/D
FILM, EXTENDED RELEASE TRANSDERMAL
Status: ACTIVE | COMMUNITY
Start: 2020-03-16

## 2023-10-12 RX ORDER — ESOMEPRAZOLE MAGNESIUM 40 MG/1
CAPSULE, DELAYED RELEASE ORAL
Status: ACTIVE | COMMUNITY
Start: 2020-03-16

## 2023-10-12 RX ORDER — CANDESARTAN CILEXETIL 16 MG/1
TABLET ORAL
Status: ACTIVE | COMMUNITY
Start: 2020-03-16

## 2023-10-12 RX ORDER — ONDANSETRON 4 MG/1
1 DOSE PO/SL Q4HR PRN NAUSEA TABLET, ORALLY DISINTEGRATING ORAL
Qty: 60 | Refills: 3 | Status: ACTIVE | COMMUNITY
Start: 2019-07-12

## 2023-10-12 RX ORDER — FAMOTIDINE 40 MG/1
1 TABLET TABLET, FILM COATED ORAL ONCE A DAY
Qty: 30 TABLET | Refills: 3 | Status: ACTIVE | COMMUNITY
Start: 2022-05-16

## 2023-10-12 RX ORDER — OLOPATADINE HYDROCHLORIDE 665 UG/1
2 SPRAYS IN EACH NOSTRIL SPRAY NASAL TWICE A DAY
Status: ACTIVE | COMMUNITY

## 2023-10-12 RX ORDER — ESOMEPRAZOLE MAGNESIUM 40 MG/1
1 CAPSULE CAPSULE, DELAYED RELEASE ORAL ONCE A DAY
Status: ACTIVE | COMMUNITY

## 2023-10-12 RX ORDER — METHYLPREDNISOLONE 4 MG/1
5 PILLS FIRST DAY, THEN 4 PILLS SECOND DAY, THEN 3 PILLS FOR A DAY, THEN 2 PILL A DAY, THEN 1 PILL A DAY TABLET ORAL
Qty: 15 | Refills: 0 | Status: ACTIVE | COMMUNITY

## 2023-10-12 RX ORDER — FAMOTIDINE 40 MG/1
1 TABLET AT BEDTIME TABLET, FILM COATED ORAL ONCE A DAY
Qty: 30 | Status: ACTIVE | COMMUNITY
Start: 2022-06-17

## 2023-10-12 RX ORDER — PROGESTERONE 100 MG/1
CAPSULE ORAL
Status: ACTIVE | COMMUNITY
Start: 2020-03-16

## 2023-10-12 RX ORDER — PANTOPRAZOLE SODIUM 40 MG/1
1 TABLET TABLET, DELAYED RELEASE ORAL TWICE A DAY
Qty: 60 | Refills: 2 | Status: ACTIVE | COMMUNITY

## 2023-10-12 RX ORDER — BUPROPION HYDROCHLORIDE 300 MG/1
TABLET, EXTENDED RELEASE ORAL
Status: ACTIVE | COMMUNITY
Start: 2020-03-16

## 2023-10-12 RX ORDER — ESTRADIOL 10 UG/1
INSERT VAGINAL
Status: ACTIVE | COMMUNITY
Start: 2020-03-16

## 2023-10-12 RX ORDER — BETAMETHASONE DIPROPIONATE 0.5 MG/G
CREAM TOPICAL
Status: ACTIVE | COMMUNITY
Start: 2020-03-16

## 2023-10-12 RX ORDER — ESOMEPRAZOLE MAGNESIUM 40 MG
1 CAPSULE BY MOUTH ONCE A DAY CAPSULE,DELAYED RELEASE (ENTERIC COATED) ORAL
Status: ACTIVE | COMMUNITY
Start: 2020-12-15

## 2023-10-12 RX ORDER — BACLOFEN 10 MG/1
TAKE 1 TABLET BY MOUTH TWICE A DAY TABLET ORAL
Status: ACTIVE | COMMUNITY
Start: 2020-03-16

## 2023-10-12 RX ORDER — CLINDAMYCIN PHOSPHATE 150 MG/ML
INJECTION, SOLUTION INTRAMUSCULAR; INTRAVENOUS
Status: ACTIVE | COMMUNITY
Start: 2020-03-16

## 2023-10-12 RX ORDER — DOCUSATE SODIUM 100 MG/1
1 CAPSULE AS NEEDED CAPSULE, LIQUID FILLED ORAL ONCE A DAY
Status: ACTIVE | COMMUNITY

## 2023-10-12 RX ORDER — ESOMEPRAZOLE MAGNESIUM 20 MG/1
1 CAPSULE CAPSULE, DELAYED RELEASE ORAL ONCE A DAY
Qty: 30 | Status: ACTIVE | COMMUNITY
Start: 2022-06-17

## 2023-10-12 RX ORDER — AMITRIPTYLINE HYDROCHLORIDE 10 MG/1
5MG PO QHS X1WEEK 10MG PO QHS X1WEEK 15MGPO QHS X1WEEK 20MGPO QHS X1WEEK TABLET, FILM COATED ORAL
Status: ACTIVE | COMMUNITY
Start: 2021-12-10

## 2023-10-12 NOTE — HPI-TODAY'S VISIT:
This is a 58-year-old female seen in follow-up consultation for symptoms.  After she started back to school with stress some of her symptoms did return.  She has degenerative disease in her cervical neck.  2 weeks ago she developed a sore throat and viral syndrome but that is improving.  She is taking Nexium.  She saw an ENT.  For her phlegm.  She was given prednisone that did not help.  She is also having some constipation is worse on the Cymbalta.  She is taking fiber every day she is doing yoga and exercising.  She has a previous history of gastroparesis dysphagia GERD and IBS.  Manometry was normal

## 2024-02-15 ENCOUNTER — OV EP (OUTPATIENT)
Dept: URBAN - METROPOLITAN AREA CLINIC 27 | Facility: CLINIC | Age: 59
End: 2024-02-15
Payer: COMMERCIAL

## 2024-02-15 VITALS
HEART RATE: 84 BPM | SYSTOLIC BLOOD PRESSURE: 130 MMHG | DIASTOLIC BLOOD PRESSURE: 89 MMHG | WEIGHT: 120 LBS | BODY MASS INDEX: 21.26 KG/M2 | HEIGHT: 63 IN

## 2024-02-15 DIAGNOSIS — R49.8 OTHER VOICE AND RESONANCE DISORDERS: ICD-10-CM

## 2024-02-15 DIAGNOSIS — K21.9 GERD: ICD-10-CM

## 2024-02-15 DIAGNOSIS — K58.8 OTHER IRRITABLE BOWEL SYNDROME: ICD-10-CM

## 2024-02-15 DIAGNOSIS — K31.84 GASTROPARESIS: ICD-10-CM

## 2024-02-15 PROCEDURE — 99213 OFFICE O/P EST LOW 20 MIN: CPT | Performed by: INTERNAL MEDICINE

## 2024-02-15 RX ORDER — CANDESARTAN CILEXETIL 16 MG/1
TABLET ORAL
Status: ACTIVE | COMMUNITY
Start: 2020-03-16

## 2024-02-15 RX ORDER — PANTOPRAZOLE SODIUM 40 MG/1
1 TABLET TABLET, DELAYED RELEASE ORAL TWICE A DAY
Qty: 60 | Refills: 2 | Status: ACTIVE | COMMUNITY

## 2024-02-15 RX ORDER — BETAMETHASONE DIPROPIONATE 0.5 MG/G
CREAM TOPICAL
Status: ACTIVE | COMMUNITY
Start: 2020-03-16

## 2024-02-15 RX ORDER — BUPROPION HYDROCHLORIDE 300 MG/1
TABLET, EXTENDED RELEASE ORAL
Status: ACTIVE | COMMUNITY
Start: 2020-03-16

## 2024-02-15 RX ORDER — FLUTICASONE PROPIONATE 50 UG/1
SPRAY, METERED NASAL
Status: ACTIVE | COMMUNITY
Start: 2020-03-16

## 2024-02-15 RX ORDER — ELECTROLYTES/DEXTROSE
SOLUTION, ORAL ORAL
Status: ACTIVE | COMMUNITY
Start: 2020-03-16

## 2024-02-15 RX ORDER — DOCUSATE SODIUM 100 MG/1
1 CAPSULE AS NEEDED CAPSULE, LIQUID FILLED ORAL ONCE A DAY
Status: ACTIVE | COMMUNITY

## 2024-02-15 RX ORDER — AMITRIPTYLINE HYDROCHLORIDE 10 MG/1
5MG PO QHS X1WEEK 10MG PO QHS X1WEEK 15MGPO QHS X1WEEK 20MGPO QHS X1WEEK TABLET, FILM COATED ORAL
Status: ACTIVE | COMMUNITY
Start: 2021-12-10

## 2024-02-15 RX ORDER — PROGESTERONE 100 MG/1
CAPSULE ORAL
Status: ACTIVE | COMMUNITY
Start: 2020-03-16

## 2024-02-15 RX ORDER — ONDANSETRON 4 MG/1
1 DOSE PO/SL Q4HR PRN NAUSEA TABLET, ORALLY DISINTEGRATING ORAL
Qty: 60 | Refills: 3 | Status: ACTIVE | COMMUNITY
Start: 2019-07-12

## 2024-02-15 RX ORDER — ESOMEPRAZOLE MAGNESIUM 20 MG/1
TAKE 1 CAPSULE BY MOUTH ONCE A DAY CAPSULE, DELAYED RELEASE ORAL
Status: ACTIVE | COMMUNITY
Start: 2021-08-09

## 2024-02-15 RX ORDER — ESTRADIOL 0.03 MG/D
FILM, EXTENDED RELEASE TRANSDERMAL
Status: ACTIVE | COMMUNITY
Start: 2020-03-16

## 2024-02-15 RX ORDER — FAMOTIDINE 40 MG/1
1 TABLET AT BEDTIME TABLET, FILM COATED ORAL ONCE A DAY
Qty: 30 | Status: ACTIVE | COMMUNITY
Start: 2022-06-17

## 2024-02-15 RX ORDER — ESOMEPRAZOLE MAGNESIUM 40 MG/1
1 CAPSULE CAPSULE, DELAYED RELEASE ORAL ONCE A DAY
Status: ACTIVE | COMMUNITY

## 2024-02-15 RX ORDER — METOCLOPRAMIDE HYDROCHLORIDE 5 MG/1
TABLET ORAL
Status: ACTIVE | COMMUNITY
Start: 2020-03-16

## 2024-02-15 RX ORDER — FAMOTIDINE 10 MG
TABLET ORAL
Status: ACTIVE | COMMUNITY
Start: 2020-03-16

## 2024-02-15 RX ORDER — FAMOTIDINE 40 MG/1
1 TABLET TABLET, FILM COATED ORAL ONCE A DAY
Qty: 30 TABLET | Refills: 3 | Status: ACTIVE | COMMUNITY
Start: 2022-05-16

## 2024-02-15 RX ORDER — ESOMEPRAZOLE MAGNESIUM 20 MG/1
1 CAPSULE CAPSULE, DELAYED RELEASE ORAL ONCE A DAY
Qty: 30 | Status: ACTIVE | COMMUNITY
Start: 2022-06-17

## 2024-02-15 RX ORDER — ESOMEPRAZOLE MAGNESIUM 40 MG
1 CAPSULE BY MOUTH ONCE A DAY CAPSULE,DELAYED RELEASE (ENTERIC COATED) ORAL
Status: ACTIVE | COMMUNITY
Start: 2020-12-15

## 2024-02-15 RX ORDER — CHROMIUM 200 MCG
TABLET ORAL
Status: ACTIVE | COMMUNITY
Start: 2021-07-12

## 2024-02-15 RX ORDER — ESOMEPRAZOLE MAGNESIUM 40 MG/1
CAPSULE, DELAYED RELEASE ORAL
Status: ACTIVE | COMMUNITY
Start: 2020-03-16

## 2024-02-15 RX ORDER — ESTRADIOL 0.03 MG/D
1 PATCH TO SKIN PATCH TRANSDERMAL
Status: ACTIVE | COMMUNITY

## 2024-02-15 RX ORDER — ESTRADIOL 10 UG/1
INSERT VAGINAL
Status: ACTIVE | COMMUNITY
Start: 2020-03-16

## 2024-02-15 RX ORDER — CLINDAMYCIN PHOSPHATE 150 MG/ML
INJECTION, SOLUTION INTRAMUSCULAR; INTRAVENOUS
Status: ACTIVE | COMMUNITY
Start: 2020-03-16

## 2024-02-15 RX ORDER — OLOPATADINE HYDROCHLORIDE 665 UG/1
2 SPRAYS IN EACH NOSTRIL SPRAY NASAL TWICE A DAY
Status: ACTIVE | COMMUNITY

## 2024-02-15 RX ORDER — OLOPATADINE HYDROCHLORIDE 600 UG/1
SPRAY, METERED NASAL
Status: ACTIVE | COMMUNITY
Start: 2020-03-16

## 2024-02-15 RX ORDER — METHYLPREDNISOLONE 4 MG/1
5 PILLS FIRST DAY, THEN 4 PILLS SECOND DAY, THEN 3 PILLS FOR A DAY, THEN 2 PILL A DAY, THEN 1 PILL A DAY TABLET ORAL
Qty: 15 | Refills: 0 | Status: ACTIVE | COMMUNITY

## 2024-02-15 RX ORDER — BACLOFEN 10 MG/1
TAKE 1 TABLET BY MOUTH TWICE A DAY TABLET ORAL
Status: ACTIVE | COMMUNITY
Start: 2020-03-16

## 2024-02-15 NOTE — PHYSICAL EXAM LUNGS:
Patient contacted office with questions regarding Golytely Prep. All procedure questions were answere. Patient expressed understanding. normal

## 2024-02-15 NOTE — HPI-TODAY'S VISIT:
This is a 58-year-old female seen in follow-up consultation today for her upper GI symptoms.  She is currently taking Cymbalta, Nexium 20 mg in the morning and famotidine 40 mg at night.  Overall her reflux is doing okay.  She is taking fiber through prunes daily work is a stressor and sometimes makes her symptoms worse.  She is more constipated on the Cymbalta but has cut it back and may be a little less constipated now she also eats some kiwi fruit as well.  In January she had COVID.  Then her mother got it was in the hospital but she is now back at work and she is back and exercising which seems to help her overall stress level

## 2024-06-20 ENCOUNTER — DASHBOARD ENCOUNTERS (OUTPATIENT)
Age: 59
End: 2024-06-20

## 2024-06-20 ENCOUNTER — OFFICE VISIT (OUTPATIENT)
Dept: URBAN - METROPOLITAN AREA CLINIC 27 | Facility: CLINIC | Age: 59
End: 2024-06-20
Payer: COMMERCIAL

## 2024-06-20 VITALS
HEART RATE: 92 BPM | WEIGHT: 120 LBS | DIASTOLIC BLOOD PRESSURE: 87 MMHG | SYSTOLIC BLOOD PRESSURE: 120 MMHG | HEIGHT: 63 IN | BODY MASS INDEX: 21.26 KG/M2

## 2024-06-20 DIAGNOSIS — K21.9 GERD: ICD-10-CM

## 2024-06-20 DIAGNOSIS — K58.8 OTHER IRRITABLE BOWEL SYNDROME: ICD-10-CM

## 2024-06-20 DIAGNOSIS — K31.84 GASTROPARESIS: ICD-10-CM

## 2024-06-20 DIAGNOSIS — R49.8 OTHER VOICE AND RESONANCE DISORDERS: ICD-10-CM

## 2024-06-20 PROCEDURE — 99213 OFFICE O/P EST LOW 20 MIN: CPT | Performed by: INTERNAL MEDICINE

## 2024-06-20 RX ORDER — ESOMEPRAZOLE MAGNESIUM 40 MG/1
1 CAPSULE CAPSULE, DELAYED RELEASE ORAL ONCE A DAY
Status: ACTIVE | COMMUNITY

## 2024-06-20 RX ORDER — ESTRADIOL 0.03 MG/D
FILM, EXTENDED RELEASE TRANSDERMAL
Status: ACTIVE | COMMUNITY
Start: 2020-03-16

## 2024-06-20 RX ORDER — FAMOTIDINE 10 MG
TABLET ORAL
Status: ACTIVE | COMMUNITY
Start: 2020-03-16

## 2024-06-20 RX ORDER — CLINDAMYCIN PHOSPHATE 150 MG/ML
INJECTION, SOLUTION INTRAMUSCULAR; INTRAVENOUS
Status: ACTIVE | COMMUNITY
Start: 2020-03-16

## 2024-06-20 RX ORDER — CHROMIUM 200 MCG
TABLET ORAL
Status: ACTIVE | COMMUNITY
Start: 2021-07-12

## 2024-06-20 RX ORDER — METHYLPREDNISOLONE 4 MG/1
5 PILLS FIRST DAY, THEN 4 PILLS SECOND DAY, THEN 3 PILLS FOR A DAY, THEN 2 PILL A DAY, THEN 1 PILL A DAY TABLET ORAL
Qty: 15 | Refills: 0 | Status: ACTIVE | COMMUNITY

## 2024-06-20 RX ORDER — ESOMEPRAZOLE MAGNESIUM 20 MG/1
1 CAPSULE CAPSULE, DELAYED RELEASE ORAL ONCE A DAY
Qty: 30 | Status: ACTIVE | COMMUNITY
Start: 2022-06-17

## 2024-06-20 RX ORDER — ESOMEPRAZOLE MAGNESIUM 40 MG/1
CAPSULE, DELAYED RELEASE ORAL
Status: ACTIVE | COMMUNITY
Start: 2020-03-16

## 2024-06-20 RX ORDER — DOCUSATE SODIUM 100 MG/1
1 CAPSULE AS NEEDED CAPSULE, LIQUID FILLED ORAL ONCE A DAY
Status: ACTIVE | COMMUNITY

## 2024-06-20 RX ORDER — METOCLOPRAMIDE HYDROCHLORIDE 5 MG/1
TABLET ORAL
Status: ACTIVE | COMMUNITY
Start: 2020-03-16

## 2024-06-20 RX ORDER — ESTRADIOL 0.03 MG/D
1 PATCH TO SKIN PATCH TRANSDERMAL
Status: ACTIVE | COMMUNITY

## 2024-06-20 RX ORDER — OLOPATADINE HYDROCHLORIDE 600 UG/1
SPRAY, METERED NASAL
Status: ACTIVE | COMMUNITY
Start: 2020-03-16

## 2024-06-20 RX ORDER — ESOMEPRAZOLE MAGNESIUM 20 MG/1
TAKE 1 CAPSULE BY MOUTH ONCE A DAY CAPSULE, DELAYED RELEASE ORAL
Status: ACTIVE | COMMUNITY
Start: 2021-08-09

## 2024-06-20 RX ORDER — ESTRADIOL 10 UG/1
INSERT VAGINAL
Status: ACTIVE | COMMUNITY
Start: 2020-03-16

## 2024-06-20 RX ORDER — ONDANSETRON 4 MG/1
1 DOSE PO/SL Q4HR PRN NAUSEA TABLET, ORALLY DISINTEGRATING ORAL
Qty: 60 | Refills: 3 | Status: ACTIVE | COMMUNITY
Start: 2019-07-12

## 2024-06-20 RX ORDER — OLOPATADINE HYDROCHLORIDE 665 UG/1
2 SPRAYS IN EACH NOSTRIL SPRAY NASAL TWICE A DAY
Status: ACTIVE | COMMUNITY

## 2024-06-20 RX ORDER — BACLOFEN 10 MG/1
TAKE 1 TABLET BY MOUTH TWICE A DAY TABLET ORAL
Status: ACTIVE | COMMUNITY
Start: 2020-03-16

## 2024-06-20 RX ORDER — BUPROPION HYDROCHLORIDE 300 MG/1
TABLET, EXTENDED RELEASE ORAL
Status: ACTIVE | COMMUNITY
Start: 2020-03-16

## 2024-06-20 RX ORDER — ELECTROLYTES/DEXTROSE
SOLUTION, ORAL ORAL
Status: ACTIVE | COMMUNITY
Start: 2020-03-16

## 2024-06-20 RX ORDER — PROGESTERONE 100 MG/1
CAPSULE ORAL
Status: ACTIVE | COMMUNITY
Start: 2020-03-16

## 2024-06-20 RX ORDER — FAMOTIDINE 40 MG/1
1 TABLET AT BEDTIME TABLET, FILM COATED ORAL ONCE A DAY
Qty: 30 | Status: ACTIVE | COMMUNITY
Start: 2022-06-17

## 2024-06-20 RX ORDER — FLUTICASONE PROPIONATE 50 UG/1
SPRAY, METERED NASAL
Status: ACTIVE | COMMUNITY
Start: 2020-03-16

## 2024-06-20 RX ORDER — AMITRIPTYLINE HYDROCHLORIDE 10 MG/1
5MG PO QHS X1WEEK 10MG PO QHS X1WEEK 15MGPO QHS X1WEEK 20MGPO QHS X1WEEK TABLET, FILM COATED ORAL
Status: ACTIVE | COMMUNITY
Start: 2021-12-10

## 2024-06-20 RX ORDER — ESOMEPRAZOLE MAGNESIUM 40 MG
1 CAPSULE BY MOUTH ONCE A DAY CAPSULE,DELAYED RELEASE (ENTERIC COATED) ORAL
Status: ACTIVE | COMMUNITY
Start: 2020-12-15

## 2024-06-20 RX ORDER — PANTOPRAZOLE SODIUM 40 MG/1
1 TABLET TABLET, DELAYED RELEASE ORAL TWICE A DAY
Qty: 60 | Refills: 2 | Status: ACTIVE | COMMUNITY

## 2024-06-20 RX ORDER — CANDESARTAN CILEXETIL 16 MG/1
TABLET ORAL
Status: ACTIVE | COMMUNITY
Start: 2020-03-16

## 2024-06-20 RX ORDER — FAMOTIDINE 40 MG/1
1 TABLET TABLET, FILM COATED ORAL ONCE A DAY
Qty: 30 TABLET | Refills: 3 | Status: ACTIVE | COMMUNITY
Start: 2022-05-16

## 2024-06-20 RX ORDER — BETAMETHASONE DIPROPIONATE 0.5 MG/G
CREAM TOPICAL
Status: ACTIVE | COMMUNITY
Start: 2020-03-16

## 2024-06-20 NOTE — HPI-TODAY'S VISIT:
This is a 59-year-old female seen in follow-up consultation for her dyspepsia and reflux symptoms.  She is currently generally back to her baseline.  She is taking a trip to [place].  She then got the flu.  She developed a cough.  That is been lingering.  From a GI standpoint she is doing a little bit better.  She is taking align probiotic and that is helping but she still has a postnasal drip she saw Dr. Alexander.  She also has adjusted her duloxetine which also was making her constipation worse.  She is now changed to Strattera.  For the reflux she is taking Nexium 20 mg morning famotidine 40 mg at night.  Colonoscopy in 2019 was normal.  She had a previous manometry that was essentially normal.  Upper endoscopy last year was normal as well.

## 2025-07-28 ENCOUNTER — OFFICE VISIT (OUTPATIENT)
Dept: URBAN - METROPOLITAN AREA CLINIC 27 | Facility: CLINIC | Age: 60
End: 2025-07-28
Payer: COMMERCIAL

## 2025-07-28 ENCOUNTER — TELEPHONE ENCOUNTER (OUTPATIENT)
Dept: URBAN - METROPOLITAN AREA CLINIC 27 | Facility: CLINIC | Age: 60
End: 2025-07-28

## 2025-07-28 DIAGNOSIS — K59.00 CONSTIPATION, UNSPECIFIED: ICD-10-CM

## 2025-07-28 DIAGNOSIS — K21.9 GASTRO-ESOPHAGEAL REFLUX DISEASE WITHOUT ESOPHAGITIS: ICD-10-CM

## 2025-07-28 PROCEDURE — 99213 OFFICE O/P EST LOW 20 MIN: CPT | Performed by: INTERNAL MEDICINE

## 2025-07-28 RX ORDER — OLOPATADINE HYDROCHLORIDE 665 UG/1
2 SPRAYS IN EACH NOSTRIL SPRAY NASAL TWICE A DAY
Status: ACTIVE | COMMUNITY

## 2025-07-28 RX ORDER — ESOMEPRAZOLE MAGNESIUM 40 MG/1
CAPSULE, DELAYED RELEASE ORAL
Status: ACTIVE | COMMUNITY
Start: 2020-03-16

## 2025-07-28 RX ORDER — METOCLOPRAMIDE HYDROCHLORIDE 5 MG/1
TABLET ORAL
Status: ACTIVE | COMMUNITY
Start: 2020-03-16

## 2025-07-28 RX ORDER — FAMOTIDINE 10 MG
TABLET ORAL
Status: ACTIVE | COMMUNITY
Start: 2020-03-16

## 2025-07-28 RX ORDER — OLOPATADINE HYDROCHLORIDE 600 UG/1
SPRAY, METERED NASAL
Status: ACTIVE | COMMUNITY
Start: 2020-03-16

## 2025-07-28 RX ORDER — AMITRIPTYLINE HYDROCHLORIDE 10 MG/1
5MG PO QHS X1WEEK 10MG PO QHS X1WEEK 15MGPO QHS X1WEEK 20MGPO QHS X1WEEK TABLET, FILM COATED ORAL
Status: ACTIVE | COMMUNITY
Start: 2021-12-10

## 2025-07-28 RX ORDER — CHROMIUM 200 MCG
TABLET ORAL
Status: ACTIVE | COMMUNITY
Start: 2021-07-12

## 2025-07-28 RX ORDER — ESOMEPRAZOLE MAGNESIUM 40 MG/1
1 CAPSULE CAPSULE, DELAYED RELEASE ORAL ONCE A DAY
Status: ACTIVE | COMMUNITY

## 2025-07-28 RX ORDER — DOCUSATE SODIUM 100 MG/1
1 CAPSULE AS NEEDED CAPSULE, LIQUID FILLED ORAL ONCE A DAY
Status: ACTIVE | COMMUNITY

## 2025-07-28 RX ORDER — ESTRADIOL 0.03 MG/D
FILM, EXTENDED RELEASE TRANSDERMAL
Status: ACTIVE | COMMUNITY
Start: 2020-03-16

## 2025-07-28 RX ORDER — PROGESTERONE 100 MG/1
CAPSULE ORAL
Status: ACTIVE | COMMUNITY
Start: 2020-03-16

## 2025-07-28 RX ORDER — BACLOFEN 10 MG/1
TAKE 1 TABLET BY MOUTH TWICE A DAY TABLET ORAL
Status: ACTIVE | COMMUNITY
Start: 2020-03-16

## 2025-07-28 RX ORDER — ESOMEPRAZOLE MAGNESIUM 20 MG/1
TAKE 1 CAPSULE BY MOUTH ONCE A DAY CAPSULE, DELAYED RELEASE ORAL
Status: ACTIVE | COMMUNITY
Start: 2021-08-09

## 2025-07-28 RX ORDER — FAMOTIDINE 40 MG/1
1 TABLET TABLET, FILM COATED ORAL ONCE A DAY
Qty: 30 TABLET | Refills: 3 | Status: ACTIVE | COMMUNITY
Start: 2022-05-16

## 2025-07-28 RX ORDER — PANTOPRAZOLE SODIUM 40 MG/1
1 TABLET TABLET, DELAYED RELEASE ORAL TWICE A DAY
Qty: 60 | Refills: 2 | Status: ACTIVE | COMMUNITY

## 2025-07-28 RX ORDER — FAMOTIDINE 40 MG/1
1 TABLET AT BEDTIME TABLET, FILM COATED ORAL ONCE A DAY
Qty: 30 | Status: ACTIVE | COMMUNITY
Start: 2022-06-17

## 2025-07-28 RX ORDER — CANDESARTAN CILEXETIL 16 MG/1
TABLET ORAL
Status: ACTIVE | COMMUNITY
Start: 2020-03-16

## 2025-07-28 RX ORDER — METHYLPREDNISOLONE 4 MG/1
5 PILLS FIRST DAY, THEN 4 PILLS SECOND DAY, THEN 3 PILLS FOR A DAY, THEN 2 PILL A DAY, THEN 1 PILL A DAY TABLET ORAL
Qty: 15 | Refills: 0 | Status: ACTIVE | COMMUNITY

## 2025-07-28 RX ORDER — ESOMEPRAZOLE MAGNESIUM 40 MG
1 CAPSULE BY MOUTH ONCE A DAY CAPSULE,DELAYED RELEASE (ENTERIC COATED) ORAL
Status: ACTIVE | COMMUNITY
Start: 2020-12-15

## 2025-07-28 RX ORDER — BETAMETHASONE DIPROPIONATE 0.5 MG/G
CREAM TOPICAL
Status: ACTIVE | COMMUNITY
Start: 2020-03-16

## 2025-07-28 RX ORDER — ONDANSETRON 4 MG/1
1 DOSE PO/SL Q4HR PRN NAUSEA TABLET, ORALLY DISINTEGRATING ORAL
Qty: 60 | Refills: 3 | Status: ACTIVE | COMMUNITY
Start: 2019-07-12

## 2025-07-28 RX ORDER — FLUTICASONE PROPIONATE 50 UG/1
SPRAY, METERED NASAL
Status: ACTIVE | COMMUNITY
Start: 2020-03-16

## 2025-07-28 RX ORDER — CLINDAMYCIN PHOSPHATE 150 MG/ML
INJECTION, SOLUTION INTRAMUSCULAR; INTRAVENOUS
Status: ACTIVE | COMMUNITY
Start: 2020-03-16

## 2025-07-28 RX ORDER — ESTRADIOL 0.03 MG/D
1 PATCH TO SKIN PATCH TRANSDERMAL
Status: ACTIVE | COMMUNITY

## 2025-07-28 RX ORDER — ELECTROLYTES/DEXTROSE
SOLUTION, ORAL ORAL
Status: ACTIVE | COMMUNITY
Start: 2020-03-16

## 2025-07-28 RX ORDER — ESTRADIOL 10 UG/1
INSERT VAGINAL
Status: ACTIVE | COMMUNITY
Start: 2020-03-16

## 2025-07-28 RX ORDER — ESOMEPRAZOLE MAGNESIUM 20 MG/1
1 CAPSULE CAPSULE, DELAYED RELEASE ORAL ONCE A DAY
Qty: 30 | Status: ACTIVE | COMMUNITY
Start: 2022-06-17

## 2025-07-28 RX ORDER — BUPROPION HYDROCHLORIDE 300 MG/1
TABLET, EXTENDED RELEASE ORAL
Status: ACTIVE | COMMUNITY
Start: 2020-03-16

## 2025-07-28 NOTE — HPI-HPI
Hood Felipe, a 60-year-old female, presented for a chronic condition follow-up focused on her ongoing constipation and acid reflux. Over the past year, she has experienced worsening constipation, which she attributes in part to medication changes, including trials of Cymbalta and Strattera for ADD. Both medications exacerbated her constipation, leading her to use Miralax and fiber supplements more regularly. She described episodes of bowel leakage and pellet-like stools, particularly when her routine was disrupted-such as during a recent three-week trip to care for her mother. During that time, she was unable to maintain her usual dietary habits, including her regular intake of pea protein powder and fiber, which she believes help bulk her stool and improve regularity. She currently takes Miralax most days, though not every day, sometimes forgetting or skipping doses due to her work schedule. She also reported a history of recurrent urinary tract infections about ten years ago, which required antibiotic treatment. In addition to her bowel issues, Hood has noticed a recent worsening of her acid reflux, particularly over the past two weeks. The symptoms are most pronounced at night, especially after periods of inactivity or lying down, and are sometimes relieved by eating walnuts or small snacks. She has not noticed any increase in symptoms with exertion, such as walking up stairs, and she remains active after dinner. She has not been consistently taking her evening dose of Pepcid as recommended, sometimes failing to wait the full half hour before lying down. She is aware that keeping her bowels clear helps reduce her acid symptoms and has been adjusting her meal timing and content accordingly. Her history includes esophageal motor dysfunction, IEM by manometry and positive pH study, and reflux damage, previously managed with Nexium and Pepcid. She continues to use Nexium and is mindful of strategies to minimize her symptoms, such as avoiding eating close to bedtime and maintaining regular physical activity in the evenings.

## 2025-07-28 NOTE — PHYSICAL EXAM EYES:
Conjunctivae and eyelids appear normal, Sclerae : White without injection, no icterus , Sclerae : White without injection

## 2025-08-05 ENCOUNTER — WEB ENCOUNTER (OUTPATIENT)
Dept: URBAN - METROPOLITAN AREA CLINIC 27 | Facility: CLINIC | Age: 60
End: 2025-08-05

## 2025-08-08 ENCOUNTER — WEB ENCOUNTER (OUTPATIENT)
Dept: URBAN - METROPOLITAN AREA CLINIC 27 | Facility: CLINIC | Age: 60
End: 2025-08-08